# Patient Record
Sex: FEMALE | Race: WHITE | NOT HISPANIC OR LATINO | RURAL
[De-identification: names, ages, dates, MRNs, and addresses within clinical notes are randomized per-mention and may not be internally consistent; named-entity substitution may affect disease eponyms.]

---

## 2018-02-28 ENCOUNTER — OFFICE (AMBULATORY)
Dept: RURAL CLINIC 3 | Facility: CLINIC | Age: 73
End: 2018-02-28

## 2018-02-28 VITALS
HEIGHT: 66 IN | DIASTOLIC BLOOD PRESSURE: 85 MMHG | SYSTOLIC BLOOD PRESSURE: 151 MMHG | WEIGHT: 124 LBS | HEART RATE: 79 BPM

## 2018-02-28 DIAGNOSIS — R13.10 DYSPHAGIA, UNSPECIFIED: ICD-10-CM

## 2018-02-28 PROCEDURE — 99202 OFFICE O/P NEW SF 15 MIN: CPT | Performed by: NURSE PRACTITIONER

## 2018-04-04 ENCOUNTER — ON CAMPUS - OUTPATIENT (AMBULATORY)
Dept: RURAL HOSPITAL 3 | Facility: HOSPITAL | Age: 73
End: 2018-04-04

## 2018-04-04 DIAGNOSIS — K22.2 ESOPHAGEAL OBSTRUCTION: ICD-10-CM

## 2018-04-04 DIAGNOSIS — R13.10 DYSPHAGIA, UNSPECIFIED: ICD-10-CM

## 2018-04-04 DIAGNOSIS — K25.9 GASTRIC ULCER, UNSPECIFIED AS ACUTE OR CHRONIC, WITHOUT HEMO: ICD-10-CM

## 2018-04-04 PROCEDURE — 43450 DILATE ESOPHAGUS 1/MULT PASS: CPT | Performed by: INTERNAL MEDICINE

## 2018-04-04 PROCEDURE — 43235 EGD DIAGNOSTIC BRUSH WASH: CPT | Performed by: INTERNAL MEDICINE

## 2018-04-11 ENCOUNTER — OFFICE VISIT CONVERTED (OUTPATIENT)
Dept: ORTHOPEDIC SURGERY | Facility: CLINIC | Age: 73
End: 2018-04-11
Attending: ORTHOPAEDIC SURGERY

## 2018-05-02 ENCOUNTER — CONVERSION ENCOUNTER (OUTPATIENT)
Dept: ORTHOPEDIC SURGERY | Facility: CLINIC | Age: 73
End: 2018-05-02

## 2018-05-02 ENCOUNTER — OFFICE VISIT CONVERTED (OUTPATIENT)
Dept: ORTHOPEDIC SURGERY | Facility: CLINIC | Age: 73
End: 2018-05-02
Attending: ORTHOPAEDIC SURGERY

## 2019-09-11 ENCOUNTER — HOSPITAL ENCOUNTER (OUTPATIENT)
Dept: OTHER | Facility: HOSPITAL | Age: 74
Discharge: HOME | End: 2019-09-11
Attending: NURSE PRACTITIONER

## 2019-09-18 ENCOUNTER — HOSPITAL ENCOUNTER (OUTPATIENT)
Dept: OTHER | Facility: HOSPITAL | Age: 74
Discharge: HOME | End: 2019-09-18
Attending: NURSE PRACTITIONER

## 2020-01-16 ENCOUNTER — ON CAMPUS - OUTPATIENT (AMBULATORY)
Dept: URBAN - METROPOLITAN AREA HOSPITAL 2 | Facility: HOSPITAL | Age: 75
End: 2020-01-16
Payer: COMMERCIAL

## 2020-01-16 VITALS
SYSTOLIC BLOOD PRESSURE: 168 MMHG | RESPIRATION RATE: 16 BRPM | SYSTOLIC BLOOD PRESSURE: 222 MMHG | HEART RATE: 75 BPM | SYSTOLIC BLOOD PRESSURE: 171 MMHG | DIASTOLIC BLOOD PRESSURE: 79 MMHG | OXYGEN SATURATION: 92 % | OXYGEN SATURATION: 99 % | DIASTOLIC BLOOD PRESSURE: 96 MMHG | HEART RATE: 82 BPM | RESPIRATION RATE: 18 BRPM | TEMPERATURE: 97.7 F | HEART RATE: 84 BPM | HEART RATE: 80 BPM | HEIGHT: 66 IN | WEIGHT: 127 LBS | OXYGEN SATURATION: 100 % | SYSTOLIC BLOOD PRESSURE: 190 MMHG | SYSTOLIC BLOOD PRESSURE: 169 MMHG | DIASTOLIC BLOOD PRESSURE: 98 MMHG | OXYGEN SATURATION: 97 % | HEART RATE: 78 BPM | DIASTOLIC BLOOD PRESSURE: 130 MMHG | DIASTOLIC BLOOD PRESSURE: 89 MMHG | DIASTOLIC BLOOD PRESSURE: 104 MMHG

## 2020-01-16 DIAGNOSIS — K22.2 ESOPHAGEAL OBSTRUCTION: ICD-10-CM

## 2020-01-16 DIAGNOSIS — R13.10 DYSPHAGIA, UNSPECIFIED: ICD-10-CM

## 2020-01-16 PROCEDURE — 43450 DILATE ESOPHAGUS 1/MULT PASS: CPT | Performed by: INTERNAL MEDICINE

## 2020-01-16 PROCEDURE — 43235 EGD DIAGNOSTIC BRUSH WASH: CPT | Performed by: INTERNAL MEDICINE

## 2020-01-16 RX ORDER — PANTOPRAZOLE SODIUM 40 MG/1
40 TABLET, DELAYED RELEASE ORAL
Qty: 90 | Refills: 3 | Status: ACTIVE
Start: 2020-01-16

## 2021-05-16 VITALS — HEIGHT: 66 IN | HEART RATE: 109 BPM | WEIGHT: 127.5 LBS | OXYGEN SATURATION: 98 % | BODY MASS INDEX: 20.49 KG/M2

## 2021-05-16 VITALS — HEART RATE: 101 BPM | WEIGHT: 127 LBS | OXYGEN SATURATION: 95 % | HEIGHT: 66 IN | BODY MASS INDEX: 20.41 KG/M2

## 2022-09-07 ENCOUNTER — OFFICE (AMBULATORY)
Dept: RURAL CLINIC 3 | Facility: CLINIC | Age: 77
End: 2022-09-07

## 2022-09-07 VITALS
HEIGHT: 66 IN | WEIGHT: 123 LBS | HEART RATE: 74 BPM | SYSTOLIC BLOOD PRESSURE: 169 MMHG | DIASTOLIC BLOOD PRESSURE: 96 MMHG

## 2022-09-07 DIAGNOSIS — R13.10 DYSPHAGIA, UNSPECIFIED: ICD-10-CM

## 2022-09-07 PROCEDURE — 99214 OFFICE O/P EST MOD 30 MIN: CPT | Performed by: INTERNAL MEDICINE

## 2022-10-12 ENCOUNTER — ON CAMPUS - OUTPATIENT (AMBULATORY)
Dept: RURAL HOSPITAL 3 | Facility: HOSPITAL | Age: 77
End: 2022-10-12
Payer: COMMERCIAL

## 2022-10-12 DIAGNOSIS — K22.2 ESOPHAGEAL OBSTRUCTION: ICD-10-CM

## 2022-10-12 DIAGNOSIS — K57.30 DIVERTICULOSIS OF LARGE INTESTINE WITHOUT PERFORATION OR ABS: ICD-10-CM

## 2022-10-12 DIAGNOSIS — Z86.010 PERSONAL HISTORY OF COLONIC POLYPS: ICD-10-CM

## 2022-10-12 DIAGNOSIS — R13.10 DYSPHAGIA, UNSPECIFIED: ICD-10-CM

## 2022-10-12 PROCEDURE — 43450 DILATE ESOPHAGUS 1/MULT PASS: CPT | Mod: 59 | Performed by: INTERNAL MEDICINE

## 2022-10-12 PROCEDURE — G0105 COLORECTAL SCRN; HI RISK IND: HCPCS | Performed by: INTERNAL MEDICINE

## 2022-10-12 PROCEDURE — 43235 EGD DIAGNOSTIC BRUSH WASH: CPT | Performed by: INTERNAL MEDICINE

## 2024-08-15 ENCOUNTER — PREP FOR SURGERY (OUTPATIENT)
Dept: OTHER | Facility: HOSPITAL | Age: 79
End: 2024-08-15
Payer: MEDICARE

## 2024-08-15 ENCOUNTER — OFFICE VISIT (OUTPATIENT)
Dept: ORTHOPEDIC SURGERY | Facility: CLINIC | Age: 79
End: 2024-08-15
Payer: MEDICARE

## 2024-08-15 VITALS
WEIGHT: 115 LBS | SYSTOLIC BLOOD PRESSURE: 152 MMHG | DIASTOLIC BLOOD PRESSURE: 87 MMHG | OXYGEN SATURATION: 97 % | HEIGHT: 66 IN | HEART RATE: 90 BPM | BODY MASS INDEX: 18.48 KG/M2

## 2024-08-15 DIAGNOSIS — Z96.641 AFTERCARE FOLLOWING RIGHT HIP JOINT REPLACEMENT SURGERY: Primary | ICD-10-CM

## 2024-08-15 DIAGNOSIS — M25.551 RIGHT HIP PAIN: Primary | ICD-10-CM

## 2024-08-15 DIAGNOSIS — Z47.1 AFTERCARE FOLLOWING RIGHT HIP JOINT REPLACEMENT SURGERY: Primary | ICD-10-CM

## 2024-08-15 DIAGNOSIS — M16.11 PRIMARY OSTEOARTHRITIS OF RIGHT HIP: ICD-10-CM

## 2024-08-15 DIAGNOSIS — M16.11 PRIMARY OSTEOARTHRITIS OF RIGHT HIP: Primary | ICD-10-CM

## 2024-08-15 RX ORDER — TRANEXAMIC ACID 10 MG/ML
1000 INJECTION, SOLUTION INTRAVENOUS ONCE
OUTPATIENT
Start: 2024-08-15 | End: 2024-08-15

## 2024-08-15 RX ORDER — MULTIPLE VITAMINS W/ MINERALS TAB 9MG-400MCG
1 TAB ORAL DAILY
COMMUNITY

## 2024-08-15 RX ORDER — PANTOPRAZOLE SODIUM 40 MG/1
TABLET, DELAYED RELEASE ORAL
COMMUNITY

## 2024-08-15 RX ORDER — CHOLECALCIFEROL (VITAMIN D3) 25 MCG
1000 TABLET ORAL DAILY
COMMUNITY

## 2024-08-15 NOTE — PROGRESS NOTES
"Chief Complaint  Pain and Initial Evaluation of the Right Hip       Subjective      Mi Keane presents to [facility] for an evaluation of her right hip. Her hip has been bothering her for about a year and has gotten worse over the last few months. She has groin pain and locates her pain to the lateral hip that occasionally radiates down her leg. She denies any numbness and tingling. She reports no prior surgery or falls. She has pain getting in and out of the car and putting her shoes on and off.     No Known Allergies     Social History     Socioeconomic History    Marital status:    Tobacco Use    Smoking status: Never    Smokeless tobacco: Never   Vaping Use    Vaping status: Never Used        I reviewed the patient's chief complaint, history of present illness, review of systems, past medical history, surgical history, family history, social history, medications, and allergy list.     Review of Systems     Constitutional: Denies fevers, chills, weight loss  Cardiovascular: Denies chest pain, shortness of breath  Skin: Denies rashes, acute skin changes  Neurologic: Denies headache, loss of consciousness  MSK: Right hip pain       Vital Signs:   /87   Pulse 90   Ht 167.6 cm (66\")   Wt 52.2 kg (115 lb)   SpO2 97%   BMI 18.56 kg/m²            Ortho Exam    Physical Exam  General:Alert. No acute distress   Right lower extremity: flexion to 90 degrees, external rotation  to 30 degrees, internal rotation to 5 degrees, negative  straight leg raise, leg lengths equal, non tender, calf soft, neurovascularly intact, positive EHL, FHL, GS, and TA. Sensation intact to all 5 nerves of the foot.      Procedures    X-Ray Report:  Right hip X-Ray  Indication: Evaluation of right hip pain   AP/Lateral view(s)  Findings: advanced degenerative changes, no acute fracture, osteophytes to the right hip   Prior studies available for comparison: no       Imaging Results (Most Recent)  "      Procedure Component Value Units Date/Time    XR Hip With or Without Pelvis 2 - 3 View Right [[id number]] Resulted: 08/15/24 1031     Updated: 08/15/24 1034             Result Review :       No results found.           Assessment and Plan     Diagnoses and all orders for this visit:    1. Right hip pain (Primary)  -     XR Hip With or Without Pelvis 2 - 3 View Right    2. Primary osteoarthritis of right hip      The patient presents here today for an evaluation of her right hip. X-rays were obtained in the office today and these were reviewed today.     Discussed operative treatment options regarding a right hip arthroplasty versus non operative treatment options regarding injections, medications and physical therapy.     Patient wishes to proceed with operative treatment options. Risks and benefits regarding a right hip arthroplasty. Patient expressed understanding and wishes to proceed.      Discussed surgery., Discussed with patient the implant type being used during surgery and patient understands., Surgery pamphlet given., Call or return if worsening symptoms., DME order for a 3 in 1 given today due to patient will be confined to one room/level of the home that does not offer a toilet during postop recovery. , and I am ordering the use of the Nice1 cold therapy machine for 60 days post-op as part of an opioid-sparing approach to help manage pain and edema.  I feel this is medically necessary for the best care for this patient.       Follow Up     2 weeks post-operatively      Will obtain X-Rays of right hip  at next visit.       Patient was given instructions and counseling regarding her condition or for health maintenance advice. Please see specific information pulled into the AVS if appropriate.     Scribed for Talat Noguera MD by Nemo Martinez.  08/15/24   10:44 EDT    I have personally performed the services described in this document as scribed by the above individual and it is both accurate and  complete. Talat Noguera MD 08/15/24

## 2024-08-15 NOTE — H&P (VIEW-ONLY)
"Chief Complaint  Pain and Initial Evaluation of the Right Hip       Subjective      Mi Keane presents to [facility] for an evaluation of her right hip. Her hip has been bothering her for about a year and has gotten worse over the last few months. She has groin pain and locates her pain to the lateral hip that occasionally radiates down her leg. She denies any numbness and tingling. She reports no prior surgery or falls. She has pain getting in and out of the car and putting her shoes on and off.     No Known Allergies     Social History     Socioeconomic History    Marital status:    Tobacco Use    Smoking status: Never    Smokeless tobacco: Never   Vaping Use    Vaping status: Never Used        I reviewed the patient's chief complaint, history of present illness, review of systems, past medical history, surgical history, family history, social history, medications, and allergy list.     Review of Systems     Constitutional: Denies fevers, chills, weight loss  Cardiovascular: Denies chest pain, shortness of breath  Skin: Denies rashes, acute skin changes  Neurologic: Denies headache, loss of consciousness  MSK: Right hip pain       Vital Signs:   /87   Pulse 90   Ht 167.6 cm (66\")   Wt 52.2 kg (115 lb)   SpO2 97%   BMI 18.56 kg/m²            Ortho Exam    Physical Exam  General:Alert. No acute distress   Right lower extremity: flexion to 90 degrees, external rotation  to 30 degrees, internal rotation to 5 degrees, negative  straight leg raise, leg lengths equal, non tender, calf soft, neurovascularly intact, positive EHL, FHL, GS, and TA. Sensation intact to all 5 nerves of the foot.      Procedures    X-Ray Report:  Right hip X-Ray  Indication: Evaluation of right hip pain   AP/Lateral view(s)  Findings: advanced degenerative changes, no acute fracture, osteophytes to the right hip   Prior studies available for comparison: no       Imaging Results (Most Recent)  "      Procedure Component Value Units Date/Time    XR Hip With or Without Pelvis 2 - 3 View Right [[id number]] Resulted: 08/15/24 1031     Updated: 08/15/24 1034             Result Review :       No results found.           Assessment and Plan     Diagnoses and all orders for this visit:    1. Right hip pain (Primary)  -     XR Hip With or Without Pelvis 2 - 3 View Right    2. Primary osteoarthritis of right hip      The patient presents here today for an evaluation of her right hip. X-rays were obtained in the office today and these were reviewed today.     Discussed operative treatment options regarding a right hip arthroplasty versus non operative treatment options regarding injections, medications and physical therapy.     Patient wishes to proceed with operative treatment options. Risks and benefits regarding a right hip arthroplasty. Patient expressed understanding and wishes to proceed.      Discussed surgery., Discussed with patient the implant type being used during surgery and patient understands., Surgery pamphlet given., Call or return if worsening symptoms., DME order for a 3 in 1 given today due to patient will be confined to one room/level of the home that does not offer a toilet during postop recovery. , and I am ordering the use of the Nice1 cold therapy machine for 60 days post-op as part of an opioid-sparing approach to help manage pain and edema.  I feel this is medically necessary for the best care for this patient.       Follow Up     2 weeks post-operatively      Will obtain X-Rays of right hip  at next visit.       Patient was given instructions and counseling regarding her condition or for health maintenance advice. Please see specific information pulled into the AVS if appropriate.     Scribed for Talat Noguera MD by Nemo Martinez.  08/15/24   10:44 EDT    I have personally performed the services described in this document as scribed by the above individual and it is both accurate and  complete. Talat Noguera MD 08/15/24

## 2024-08-26 ENCOUNTER — PRE-ADMISSION TESTING (OUTPATIENT)
Dept: PREADMISSION TESTING | Facility: HOSPITAL | Age: 79
End: 2024-08-26
Payer: MEDICARE

## 2024-08-26 VITALS
HEIGHT: 63 IN | OXYGEN SATURATION: 98 % | SYSTOLIC BLOOD PRESSURE: 110 MMHG | RESPIRATION RATE: 16 BRPM | HEART RATE: 71 BPM | DIASTOLIC BLOOD PRESSURE: 78 MMHG | WEIGHT: 114.42 LBS | BODY MASS INDEX: 20.27 KG/M2 | TEMPERATURE: 98.1 F

## 2024-08-26 DIAGNOSIS — M16.11 PRIMARY OSTEOARTHRITIS OF RIGHT HIP: ICD-10-CM

## 2024-08-26 LAB
ALBUMIN SERPL-MCNC: 4 G/DL (ref 3.5–5.2)
ALBUMIN/GLOB SERPL: 1.3 G/DL
ALP SERPL-CCNC: 81 U/L (ref 39–117)
ALT SERPL W P-5'-P-CCNC: 11 U/L (ref 1–33)
ANION GAP SERPL CALCULATED.3IONS-SCNC: 10.7 MMOL/L (ref 5–15)
AST SERPL-CCNC: 15 U/L (ref 1–32)
BASOPHILS # BLD AUTO: 0.06 10*3/MM3 (ref 0–0.2)
BASOPHILS NFR BLD AUTO: 0.6 % (ref 0–1.5)
BILIRUB SERPL-MCNC: 0.4 MG/DL (ref 0–1.2)
BUN SERPL-MCNC: 19 MG/DL (ref 8–23)
BUN/CREAT SERPL: 20.2 (ref 7–25)
CALCIUM SPEC-SCNC: 10.6 MG/DL (ref 8.6–10.5)
CHLORIDE SERPL-SCNC: 97 MMOL/L (ref 98–107)
CO2 SERPL-SCNC: 26.3 MMOL/L (ref 22–29)
CREAT SERPL-MCNC: 0.94 MG/DL (ref 0.57–1)
DEPRECATED RDW RBC AUTO: 41.2 FL (ref 37–54)
EGFRCR SERPLBLD CKD-EPI 2021: 63 ML/MIN/1.73
EOSINOPHIL # BLD AUTO: 0.08 10*3/MM3 (ref 0–0.4)
EOSINOPHIL NFR BLD AUTO: 0.9 % (ref 0.3–6.2)
ERYTHROCYTE [DISTWIDTH] IN BLOOD BY AUTOMATED COUNT: 12.5 % (ref 12.3–15.4)
GLOBULIN UR ELPH-MCNC: 3 GM/DL
GLUCOSE SERPL-MCNC: 96 MG/DL (ref 65–99)
HBA1C MFR BLD: 5.7 % (ref 4.8–5.6)
HCT VFR BLD AUTO: 46.7 % (ref 34–46.6)
HGB BLD-MCNC: 15.2 G/DL (ref 12–15.9)
IMM GRANULOCYTES # BLD AUTO: 0.04 10*3/MM3 (ref 0–0.05)
IMM GRANULOCYTES NFR BLD AUTO: 0.4 % (ref 0–0.5)
LYMPHOCYTES # BLD AUTO: 0.83 10*3/MM3 (ref 0.7–3.1)
LYMPHOCYTES NFR BLD AUTO: 8.9 % (ref 19.6–45.3)
MCH RBC QN AUTO: 29.5 PG (ref 26.6–33)
MCHC RBC AUTO-ENTMCNC: 32.5 G/DL (ref 31.5–35.7)
MCV RBC AUTO: 90.5 FL (ref 79–97)
MONOCYTES # BLD AUTO: 0.73 10*3/MM3 (ref 0.1–0.9)
MONOCYTES NFR BLD AUTO: 7.8 % (ref 5–12)
NEUTROPHILS NFR BLD AUTO: 7.58 10*3/MM3 (ref 1.7–7)
NEUTROPHILS NFR BLD AUTO: 81.4 % (ref 42.7–76)
NRBC BLD AUTO-RTO: 0 /100 WBC (ref 0–0.2)
PLATELET # BLD AUTO: 336 10*3/MM3 (ref 140–450)
PMV BLD AUTO: 8.7 FL (ref 6–12)
POTASSIUM SERPL-SCNC: 4.7 MMOL/L (ref 3.5–5.2)
PROT SERPL-MCNC: 7 G/DL (ref 6–8.5)
QT INTERVAL: 376 MS
QTC INTERVAL: 414 MS
RBC # BLD AUTO: 5.16 10*6/MM3 (ref 3.77–5.28)
SODIUM SERPL-SCNC: 134 MMOL/L (ref 136–145)
WBC NRBC COR # BLD AUTO: 9.32 10*3/MM3 (ref 3.4–10.8)

## 2024-08-26 PROCEDURE — 85025 COMPLETE CBC W/AUTO DIFF WBC: CPT

## 2024-08-26 PROCEDURE — 80053 COMPREHEN METABOLIC PANEL: CPT

## 2024-08-26 PROCEDURE — 36415 COLL VENOUS BLD VENIPUNCTURE: CPT

## 2024-08-26 PROCEDURE — 83036 HEMOGLOBIN GLYCOSYLATED A1C: CPT

## 2024-08-26 PROCEDURE — 93005 ELECTROCARDIOGRAM TRACING: CPT

## 2024-08-26 NOTE — DISCHARGE INSTRUCTIONS
IMPORTANT INSTRUCTIONS - PRE-ADMISSION TESTING  DO NOT EAT OR CHEW anything after midnight the night before your procedure.    You may have CLEAR liquids up to __3___ hours prior to ARRIVAL time.   Take the following medications the morning of your procedure with JUST A SIP OF WATER:  _Prontonix _    DO NOT BRING your medications to the hospital with you, UNLESS something has changed since your PRE-Admission Testing appointment.  Hold all vitamins, supplements, and NSAIDS (Non- steroidal anti-inflammatory meds) for one week prior to surgery (you MAY take Tylenol or Acetaminophen). Including Calcium with Vitamin D and Mutivitamin  Make sure you have a ride home and have someone who will stay with you the day of your procedure after you go home.  If you have any questions, please call your Pre-Admission Testing Nurse, _Michelle GEE RN  at [phone].   Per anesthesia request, do not smoke for 24 hours before your procedure or as instructed by your surgeon.    Clear Liquid Diet        Find out when you need to start a clear liquid diet.   Think of “clear liquids” as anything you could read a newspaper through. This includes things like water, broth, sports drinks, or tea WITHOUT any kind of milk or cream.           Once you are told to start a clear liquid diet, only drink these things until 2 hours before arrival to the hospital or when the hospital says to stop. Total volume limitation: 8 oz.       Clear liquids you CAN drink:   Water   Clear broth: beef, chicken, vegetable, or bone broth with nothing in it   Gatorade   Lemonade or Griffin-aid   Soda   Tea, coffee (NO cream or honey)   Jell-O (without fruit)   Popsicles (without fruit or cream)   Italian ices   Juice without pulp: apple, white, grape   You may use salt, pepper, and sugar   NO red liquids     Do NOT drink:   Milk or cream   Soy milk, almond milk, coconut milk, or other non-dairy drinks and   creamers   Milkshakes or smoothies   Tomato juice   Drew  juice   Grapefruit juice   Cream soups or any other than broth         Clear Liquid Diet:  Do NOT eat any solid food.  Do NOT eat or suck on mints or candy.  Do NOT chew gum.  Do NOT drink thick liquids like milk or juice with pulp in it.  Do NOT add milk, cream, or anything like soy milk or almond milk to coffee or tea.       PREOPERATIVE (BEFORE SURGERY)              BATHING INSTRUCTIONS  Instructions:    You will need to shower 3 separate times utilizing the soap provided; at the times indicated   below:     8/2/24 PM    8/3/24 AM      8/3/24 PM        Wash your hair and face with normal shampoo and soap, rinse it well before using the surgical soap.      In the shower, wet the skin completely with water from your neck to your feet. Apply the cleanser to your   body ONLY FROM THE NECK TO YOUR FEET.     Do NOT USE THE CLEANSER ON YOUR FACE, HEAD, OR GENITAL (PRIVATE) AREAS.   Keep it out of your eyes, ears, and mouth because of the risk of injury to those areas.      Scrub with a clean washcloth for each bath utilizing the soap provided from the top of your body to the   bottom starting at the neck area.      Pay close attention to your armpits, groin area, and the site of surgery.      Wash your body gently for 5 minutes. Stand outside the stream or turn off the water while scrubbing your   body. Do NOT wash with your regular soap after the surgical cleanser is used.      RINSE THE CLEANSER OFF COMPLETELY with plenty of water. Rinse the area again thoroughly.      Dry off with a clean towel. The surgical soap can cause dryness; however do NOT APPLY LOTION,   CREAM, POWDER, and/or DEODORANT AFTER SHOWERING.     Be sure to where clean clothes after showering.      Ensure CLEAN BED LINENS AFTER FIRST wash with the surgical soap.      NO PETS ALLOWED IN THE BED with you after utilizing the surgical soap.

## 2024-08-26 NOTE — SIGNIFICANT NOTE
Pt goal is to have less pain so she can get back on her treadmill, Pt plans on using [facility] in [place] for therapy.  Pt's spouse to be her support with aftercare. She would like a 3 in 1 to use at home.

## 2024-08-27 ENCOUNTER — ANESTHESIA EVENT (OUTPATIENT)
Dept: PERIOP | Facility: HOSPITAL | Age: 79
End: 2024-08-27
Payer: MEDICARE

## 2024-08-27 DIAGNOSIS — M16.11 PRIMARY OSTEOARTHRITIS OF RIGHT HIP: Primary | ICD-10-CM

## 2024-09-04 ENCOUNTER — TELEPHONE (OUTPATIENT)
Dept: ORTHOPEDIC SURGERY | Facility: CLINIC | Age: 79
End: 2024-09-04

## 2024-09-04 ENCOUNTER — HOSPITAL ENCOUNTER (OUTPATIENT)
Facility: HOSPITAL | Age: 79
Discharge: HOME | End: 2024-09-05
Attending: ORTHOPAEDIC SURGERY | Admitting: ORTHOPAEDIC SURGERY
Payer: MEDICARE

## 2024-09-04 ENCOUNTER — APPOINTMENT (OUTPATIENT)
Dept: GENERAL RADIOLOGY | Facility: HOSPITAL | Age: 79
End: 2024-09-04
Payer: MEDICARE

## 2024-09-04 ENCOUNTER — ANESTHESIA (OUTPATIENT)
Dept: PERIOP | Facility: HOSPITAL | Age: 79
End: 2024-09-04
Payer: MEDICARE

## 2024-09-04 DIAGNOSIS — Z78.9 DECREASED ACTIVITIES OF DAILY LIVING (ADL): ICD-10-CM

## 2024-09-04 DIAGNOSIS — M16.11 PRIMARY OSTEOARTHRITIS OF RIGHT HIP: ICD-10-CM

## 2024-09-04 DIAGNOSIS — R26.2 DIFFICULTY IN WALKING: Primary | ICD-10-CM

## 2024-09-04 LAB
HCT VFR BLD AUTO: 38.5 % (ref 34–46.6)
HGB BLD-MCNC: 12.7 G/DL (ref 12–15.9)

## 2024-09-04 PROCEDURE — 25010000002 ROPIVACAINE PER 1 MG: Performed by: ORTHOPAEDIC SURGERY

## 2024-09-04 PROCEDURE — A9270 NON-COVERED ITEM OR SERVICE: HCPCS | Performed by: ORTHOPAEDIC SURGERY

## 2024-09-04 PROCEDURE — 76000 FLUOROSCOPY <1 HR PHYS/QHP: CPT

## 2024-09-04 PROCEDURE — A9270 NON-COVERED ITEM OR SERVICE: HCPCS | Performed by: ANESTHESIOLOGY

## 2024-09-04 PROCEDURE — 25010000002 PROPOFOL 10 MG/ML EMULSION

## 2024-09-04 PROCEDURE — 63710000001 ACETAMINOPHEN EXTRA STRENGTH 500 MG TABLET: Performed by: ORTHOPAEDIC SURGERY

## 2024-09-04 PROCEDURE — 25810000003 LACTATED RINGERS PER 1000 ML: Performed by: ANESTHESIOLOGY

## 2024-09-04 PROCEDURE — C1776 JOINT DEVICE (IMPLANTABLE): HCPCS | Performed by: ORTHOPAEDIC SURGERY

## 2024-09-04 PROCEDURE — 94799 UNLISTED PULMONARY SVC/PX: CPT

## 2024-09-04 PROCEDURE — 94761 N-INVAS EAR/PLS OXIMETRY MLT: CPT

## 2024-09-04 PROCEDURE — 25010000002 CEFAZOLIN PER 500 MG: Performed by: ORTHOPAEDIC SURGERY

## 2024-09-04 PROCEDURE — 99203 OFFICE O/P NEW LOW 30 MIN: CPT | Performed by: PHYSICIAN ASSISTANT

## 2024-09-04 PROCEDURE — 25010000002 MORPHINE PER 10 MG: Performed by: ORTHOPAEDIC SURGERY

## 2024-09-04 PROCEDURE — 73502 X-RAY EXAM HIP UNI 2-3 VIEWS: CPT

## 2024-09-04 PROCEDURE — 25810000003 LACTATED RINGERS PER 1000 ML: Performed by: ORTHOPAEDIC SURGERY

## 2024-09-04 PROCEDURE — 63710000001 FAMOTIDINE 20 MG TABLET: Performed by: ORTHOPAEDIC SURGERY

## 2024-09-04 PROCEDURE — 25010000002 PHENYLEPHRINE 10 MG/ML SOLUTION 5 ML VIAL

## 2024-09-04 PROCEDURE — 27130 TOTAL HIP ARTHROPLASTY: CPT | Performed by: PHYSICIAN ASSISTANT

## 2024-09-04 PROCEDURE — 63710000001 ACETAMINOPHEN EXTRA STRENGTH 500 MG TABLET: Performed by: ANESTHESIOLOGY

## 2024-09-04 PROCEDURE — 25010000002 MIDAZOLAM PER 1MG: Performed by: ANESTHESIOLOGY

## 2024-09-04 PROCEDURE — 85018 HEMOGLOBIN: CPT | Performed by: ORTHOPAEDIC SURGERY

## 2024-09-04 PROCEDURE — 27130 TOTAL HIP ARTHROPLASTY: CPT | Performed by: ORTHOPAEDIC SURGERY

## 2024-09-04 PROCEDURE — 25010000002 BUPIVACAINE PF 0.75 % SOLUTION: Performed by: ANESTHESIOLOGY

## 2024-09-04 PROCEDURE — 25010000002 EPINEPHRINE 1 MG/ML SOLUTION: Performed by: ORTHOPAEDIC SURGERY

## 2024-09-04 PROCEDURE — 25010000002 KETOROLAC TROMETHAMINE PER 15 MG: Performed by: ORTHOPAEDIC SURGERY

## 2024-09-04 PROCEDURE — 97161 PT EVAL LOW COMPLEX 20 MIN: CPT

## 2024-09-04 PROCEDURE — 25010000002 FENTANYL CITRATE (PF) 50 MCG/ML SOLUTION: Performed by: ANESTHESIOLOGY

## 2024-09-04 PROCEDURE — 63710000001 HYDROCODONE-ACETAMINOPHEN 7.5-325 MG TABLET: Performed by: ORTHOPAEDIC SURGERY

## 2024-09-04 PROCEDURE — 25810000003 SODIUM CHLORIDE 0.9 % SOLUTION 250 ML FLEX CONT

## 2024-09-04 PROCEDURE — 85014 HEMATOCRIT: CPT | Performed by: ORTHOPAEDIC SURGERY

## 2024-09-04 DEVICE — SHLL ACET G7 PPS LTD/HL TI SZE 52MM: Type: IMPLANTABLE DEVICE | Site: HIP | Status: FUNCTIONAL

## 2024-09-04 DEVICE — BIOLOX® DELTA, CERAMIC FEMORAL HEAD, S, Ø 36/-3.5, TAPER 12/14
Type: IMPLANTABLE DEVICE | Site: HIP | Status: FUNCTIONAL
Brand: BIOLOX® DELTA

## 2024-09-04 DEVICE — SUT NONABS MAXBRAID NMBR5 K60 38IN WHT/BLU: Type: IMPLANTABLE DEVICE | Site: HIP | Status: FUNCTIONAL

## 2024-09-04 DEVICE — IMPLANTABLE DEVICE: Type: IMPLANTABLE DEVICE | Site: HIP | Status: FUNCTIONAL

## 2024-09-04 DEVICE — STEM FEM/HIP AVENIRCOMPLETE COLAR HI/OFFST HA SZ5: Type: IMPLANTABLE DEVICE | Site: HIP | Status: FUNCTIONAL

## 2024-09-04 DEVICE — SCRW S/TAP 6.5X25MM: Type: IMPLANTABLE DEVICE | Site: HIP | Status: FUNCTIONAL

## 2024-09-04 DEVICE — TOTAL HIP PRIMARY: Type: IMPLANTABLE DEVICE | Site: HIP | Status: FUNCTIONAL

## 2024-09-04 RX ORDER — DOCUSATE SODIUM 100 MG/1
100 CAPSULE, LIQUID FILLED ORAL 2 TIMES DAILY PRN
Status: DISCONTINUED | OUTPATIENT
Start: 2024-09-04 | End: 2024-09-05 | Stop reason: HOSPADM

## 2024-09-04 RX ORDER — PROMETHAZINE HYDROCHLORIDE 12.5 MG/1
25 TABLET ORAL ONCE AS NEEDED
Status: DISCONTINUED | OUTPATIENT
Start: 2024-09-04 | End: 2024-09-04 | Stop reason: HOSPADM

## 2024-09-04 RX ORDER — SODIUM CHLORIDE, SODIUM LACTATE, POTASSIUM CHLORIDE, CALCIUM CHLORIDE 600; 310; 30; 20 MG/100ML; MG/100ML; MG/100ML; MG/100ML
100 INJECTION, SOLUTION INTRAVENOUS CONTINUOUS
Status: DISCONTINUED | OUTPATIENT
Start: 2024-09-04 | End: 2024-09-05 | Stop reason: HOSPADM

## 2024-09-04 RX ORDER — ACETAMINOPHEN 500 MG
1000 TABLET ORAL ONCE
Status: COMPLETED | OUTPATIENT
Start: 2024-09-04 | End: 2024-09-04

## 2024-09-04 RX ORDER — FAMOTIDINE 20 MG/1
40 TABLET, FILM COATED ORAL DAILY
Status: DISCONTINUED | OUTPATIENT
Start: 2024-09-04 | End: 2024-09-05 | Stop reason: HOSPADM

## 2024-09-04 RX ORDER — TRANEXAMIC ACID 10 MG/ML
1000 INJECTION, SOLUTION INTRAVENOUS ONCE
Status: COMPLETED | OUTPATIENT
Start: 2024-09-04 | End: 2024-09-04

## 2024-09-04 RX ORDER — HYDROCODONE BITARTRATE AND ACETAMINOPHEN 7.5; 325 MG/1; MG/1
1 TABLET ORAL EVERY 4 HOURS PRN
Status: DISCONTINUED | OUTPATIENT
Start: 2024-09-04 | End: 2024-09-05 | Stop reason: HOSPADM

## 2024-09-04 RX ORDER — CALCIUM CARBONATE 500 MG/1
2 TABLET, CHEWABLE ORAL 3 TIMES DAILY PRN
Status: DISCONTINUED | OUTPATIENT
Start: 2024-09-04 | End: 2024-09-05 | Stop reason: HOSPADM

## 2024-09-04 RX ORDER — OXYCODONE HYDROCHLORIDE 5 MG/1
5 TABLET ORAL
Status: DISCONTINUED | OUTPATIENT
Start: 2024-09-04 | End: 2024-09-04 | Stop reason: HOSPADM

## 2024-09-04 RX ORDER — ACETAMINOPHEN 500 MG
1000 TABLET ORAL EVERY 8 HOURS
Status: DISCONTINUED | OUTPATIENT
Start: 2024-09-04 | End: 2024-09-05 | Stop reason: HOSPADM

## 2024-09-04 RX ORDER — NALOXONE HCL 0.4 MG/ML
0.4 VIAL (ML) INJECTION
Status: DISCONTINUED | OUTPATIENT
Start: 2024-09-04 | End: 2024-09-05 | Stop reason: HOSPADM

## 2024-09-04 RX ORDER — ALUMINA, MAGNESIA, AND SIMETHICONE 2400; 2400; 240 MG/30ML; MG/30ML; MG/30ML
15 SUSPENSION ORAL EVERY 6 HOURS PRN
Status: DISCONTINUED | OUTPATIENT
Start: 2024-09-04 | End: 2024-09-05 | Stop reason: HOSPADM

## 2024-09-04 RX ORDER — ONDANSETRON 2 MG/ML
4 INJECTION INTRAMUSCULAR; INTRAVENOUS ONCE AS NEEDED
Status: DISCONTINUED | OUTPATIENT
Start: 2024-09-04 | End: 2024-09-04 | Stop reason: HOSPADM

## 2024-09-04 RX ORDER — BUPIVACAINE HYDROCHLORIDE 7.5 MG/ML
INJECTION, SOLUTION EPIDURAL; RETROBULBAR
Status: COMPLETED | OUTPATIENT
Start: 2024-09-04 | End: 2024-09-04

## 2024-09-04 RX ORDER — ONDANSETRON 2 MG/ML
4 INJECTION INTRAMUSCULAR; INTRAVENOUS EVERY 6 HOURS PRN
Status: DISCONTINUED | OUTPATIENT
Start: 2024-09-04 | End: 2024-09-05 | Stop reason: HOSPADM

## 2024-09-04 RX ORDER — PANTOPRAZOLE SODIUM 40 MG/1
40 TABLET, DELAYED RELEASE ORAL
Status: DISCONTINUED | OUTPATIENT
Start: 2024-09-05 | End: 2024-09-05 | Stop reason: HOSPADM

## 2024-09-04 RX ORDER — PROMETHAZINE HYDROCHLORIDE 25 MG/1
25 SUPPOSITORY RECTAL ONCE AS NEEDED
Status: DISCONTINUED | OUTPATIENT
Start: 2024-09-04 | End: 2024-09-04 | Stop reason: HOSPADM

## 2024-09-04 RX ORDER — HYDROCODONE BITARTRATE AND ACETAMINOPHEN 7.5; 325 MG/1; MG/1
2 TABLET ORAL EVERY 4 HOURS PRN
Status: DISCONTINUED | OUTPATIENT
Start: 2024-09-04 | End: 2024-09-05 | Stop reason: HOSPADM

## 2024-09-04 RX ORDER — MIDAZOLAM HYDROCHLORIDE 2 MG/2ML
0.5 INJECTION, SOLUTION INTRAMUSCULAR; INTRAVENOUS ONCE
Status: COMPLETED | OUTPATIENT
Start: 2024-09-04 | End: 2024-09-04

## 2024-09-04 RX ORDER — FENTANYL CITRATE 50 UG/ML
INJECTION, SOLUTION INTRAMUSCULAR; INTRAVENOUS
Status: COMPLETED | OUTPATIENT
Start: 2024-09-04 | End: 2024-09-04

## 2024-09-04 RX ORDER — BISMUTH SUBSALICYLATE 262 MG/1
524 TABLET, CHEWABLE ORAL
Status: DISCONTINUED | OUTPATIENT
Start: 2024-09-04 | End: 2024-09-05 | Stop reason: HOSPADM

## 2024-09-04 RX ORDER — SODIUM CHLORIDE 0.9 % (FLUSH) 0.9 %
10 SYRINGE (ML) INJECTION AS NEEDED
Status: DISCONTINUED | OUTPATIENT
Start: 2024-09-04 | End: 2024-09-05 | Stop reason: HOSPADM

## 2024-09-04 RX ORDER — PROMETHAZINE HYDROCHLORIDE 25 MG/1
12.5 TABLET ORAL EVERY 6 HOURS PRN
Status: DISCONTINUED | OUTPATIENT
Start: 2024-09-04 | End: 2024-09-05 | Stop reason: HOSPADM

## 2024-09-04 RX ORDER — FERROUS SULFATE 325(65) MG
325 TABLET ORAL
Status: DISCONTINUED | OUTPATIENT
Start: 2024-09-05 | End: 2024-09-05 | Stop reason: HOSPADM

## 2024-09-04 RX ORDER — MAGNESIUM HYDROXIDE 1200 MG/15ML
LIQUID ORAL AS NEEDED
Status: DISCONTINUED | OUTPATIENT
Start: 2024-09-04 | End: 2024-09-04 | Stop reason: HOSPADM

## 2024-09-04 RX ORDER — SODIUM CHLORIDE, SODIUM LACTATE, POTASSIUM CHLORIDE, CALCIUM CHLORIDE 600; 310; 30; 20 MG/100ML; MG/100ML; MG/100ML; MG/100ML
9 INJECTION, SOLUTION INTRAVENOUS CONTINUOUS PRN
Status: DISCONTINUED | OUTPATIENT
Start: 2024-09-04 | End: 2024-09-05 | Stop reason: HOSPADM

## 2024-09-04 RX ORDER — SODIUM CHLORIDE 0.9 % (FLUSH) 0.9 %
3 SYRINGE (ML) INJECTION EVERY 12 HOURS SCHEDULED
Status: DISCONTINUED | OUTPATIENT
Start: 2024-09-04 | End: 2024-09-05 | Stop reason: HOSPADM

## 2024-09-04 RX ORDER — KETOROLAC TROMETHAMINE 15 MG/ML
15 INJECTION, SOLUTION INTRAMUSCULAR; INTRAVENOUS EVERY 6 HOURS
Status: COMPLETED | OUTPATIENT
Start: 2024-09-04 | End: 2024-09-05

## 2024-09-04 RX ORDER — PROMETHAZINE HYDROCHLORIDE 12.5 MG/1
12.5 SUPPOSITORY RECTAL EVERY 6 HOURS PRN
Status: DISCONTINUED | OUTPATIENT
Start: 2024-09-04 | End: 2024-09-05 | Stop reason: HOSPADM

## 2024-09-04 RX ORDER — ACETAMINOPHEN 325 MG/1
325 TABLET ORAL EVERY 4 HOURS PRN
Status: DISCONTINUED | OUTPATIENT
Start: 2024-09-04 | End: 2024-09-05 | Stop reason: HOSPADM

## 2024-09-04 RX ORDER — ONDANSETRON 4 MG/1
4 TABLET, ORALLY DISINTEGRATING ORAL EVERY 6 HOURS PRN
Status: DISCONTINUED | OUTPATIENT
Start: 2024-09-04 | End: 2024-09-05 | Stop reason: HOSPADM

## 2024-09-04 RX ORDER — SODIUM CHLORIDE 9 MG/ML
40 INJECTION, SOLUTION INTRAVENOUS AS NEEDED
Status: DISCONTINUED | OUTPATIENT
Start: 2024-09-04 | End: 2024-09-05 | Stop reason: HOSPADM

## 2024-09-04 RX ADMIN — PROPOFOL 50 MCG/KG/MIN: 10 INJECTION, EMULSION INTRAVENOUS at 10:47

## 2024-09-04 RX ADMIN — SODIUM CHLORIDE, POTASSIUM CHLORIDE, SODIUM LACTATE AND CALCIUM CHLORIDE 100 ML/HR: 600; 310; 30; 20 INJECTION, SOLUTION INTRAVENOUS at 14:05

## 2024-09-04 RX ADMIN — SODIUM CHLORIDE, POTASSIUM CHLORIDE, SODIUM LACTATE AND CALCIUM CHLORIDE: 600; 310; 30; 20 INJECTION, SOLUTION INTRAVENOUS at 11:46

## 2024-09-04 RX ADMIN — ACETAMINOPHEN 1000 MG: 500 TABLET ORAL at 09:00

## 2024-09-04 RX ADMIN — SODIUM CHLORIDE, POTASSIUM CHLORIDE, SODIUM LACTATE AND CALCIUM CHLORIDE 100 ML/HR: 600; 310; 30; 20 INJECTION, SOLUTION INTRAVENOUS at 13:40

## 2024-09-04 RX ADMIN — ACETAMINOPHEN 1000 MG: 500 TABLET ORAL at 22:21

## 2024-09-04 RX ADMIN — ACETAMINOPHEN 1000 MG: 500 TABLET ORAL at 14:05

## 2024-09-04 RX ADMIN — FENTANYL CITRATE 10 MCG: 50 INJECTION, SOLUTION INTRAMUSCULAR; INTRAVENOUS at 10:45

## 2024-09-04 RX ADMIN — SODIUM CHLORIDE 2 G: 9 INJECTION, SOLUTION INTRAVENOUS at 17:35

## 2024-09-04 RX ADMIN — TRANEXAMIC ACID 1000 MG: 10 INJECTION, SOLUTION INTRAVENOUS at 11:46

## 2024-09-04 RX ADMIN — KETOROLAC TROMETHAMINE 15 MG: 15 INJECTION, SOLUTION INTRAMUSCULAR; INTRAVENOUS at 19:45

## 2024-09-04 RX ADMIN — TRANEXAMIC ACID 1000 MG: 10 INJECTION, SOLUTION INTRAVENOUS at 10:23

## 2024-09-04 RX ADMIN — BUPIVACAINE HYDROCHLORIDE 1.6 ML: 7.5 INJECTION, SOLUTION EPIDURAL; RETROBULBAR at 10:45

## 2024-09-04 RX ADMIN — Medication 3 ML: at 14:06

## 2024-09-04 RX ADMIN — Medication 3 ML: at 20:33

## 2024-09-04 RX ADMIN — MIDAZOLAM HYDROCHLORIDE 0.5 MG: 1 INJECTION, SOLUTION INTRAMUSCULAR; INTRAVENOUS at 10:23

## 2024-09-04 RX ADMIN — FAMOTIDINE 40 MG: 20 TABLET ORAL at 14:05

## 2024-09-04 RX ADMIN — SODIUM CHLORIDE 2 G: 9 INJECTION, SOLUTION INTRAVENOUS at 10:46

## 2024-09-04 RX ADMIN — HYDROCODONE BITARTRATE AND ACETAMINOPHEN 1 TABLET: 7.5; 325 TABLET ORAL at 16:14

## 2024-09-04 RX ADMIN — SODIUM CHLORIDE, POTASSIUM CHLORIDE, SODIUM LACTATE AND CALCIUM CHLORIDE 9 ML/HR: 600; 310; 30; 20 INJECTION, SOLUTION INTRAVENOUS at 08:47

## 2024-09-04 RX ADMIN — KETOROLAC TROMETHAMINE 15 MG: 15 INJECTION, SOLUTION INTRAMUSCULAR; INTRAVENOUS at 14:05

## 2024-09-04 RX ADMIN — PHENYLEPHRINE HYDROCHLORIDE 0.4 MCG/KG/MIN: 50 INJECTION INTRAVENOUS at 11:10

## 2024-09-04 NOTE — PLAN OF CARE
Goal Outcome Evaluation:  Plan of Care Reviewed With: patient           Outcome Evaluation: PAtient alert and oriented. x1 medicated for pain with stated relief. PAtient is a x1 assist to the bedside commode. Plans to discharge home tomorrow with outpatient PT scheduled.

## 2024-09-04 NOTE — ANESTHESIA PREPROCEDURE EVALUATION
Anesthesia Evaluation     Patient summary reviewed and Nursing notes reviewed   no history of anesthetic complications:   NPO Solid Status: > 8 hours  NPO Liquid Status: > 2 hours           Airway   Mallampati: I  TM distance: >3 FB  Neck ROM: full  Dental - normal exam     Pulmonary - negative pulmonary ROS and normal exam   Cardiovascular - normal exam  Exercise tolerance: poor (<4 METS)        Neuro/Psych- negative ROS  GI/Hepatic/Renal/Endo    (+) GERD    Musculoskeletal     Abdominal  - normal exam   Substance History - negative use     OB/GYN negative ob/gyn ROS         Other   arthritis,   history of cancer    ROS/Med Hx Other: <4METS,DEC.MOBILITY/PAIN.HX GERD. EKG REVIEW. TJR. KT               Anesthesia Plan    ASA 2     general and spinal     intravenous induction     Anesthetic plan, risks, benefits, and alternatives have been provided, discussed and informed consent has been obtained with: patient.    Plan discussed with CRNA.    CODE STATUS:

## 2024-09-04 NOTE — OP NOTE
TOTAL HIP ARTHROPLASTY ANTERIOR  Procedure Report    Patient Name:  Mi Keane  YOB: 1945    Date of Surgery:  9/4/2024     Indications:  The patient has had persistent hip pain and x-rays reveal advanced osteoarthritis.  They wish to proceed with operative treatment.  Risks and benefits of surgery were discussed.  Risk of bleeding, infection, damage to neurovascular structures, heart attack, stroke, DVT/PE, anesthesia complications including death, stiffness, leg length discrepancy, periprosthetic fracture, deep infection, among others were discussed.  Informed consent was obtained and they wish to proceed.      Pre-op Diagnosis:   Primary osteoarthritis of right hip [M16.11]       Post-Op Diagnosis Codes:     * Primary osteoarthritis of right hip [M16.11]    Procedure/CPT® Codes:      Procedure(s):  RIGHT TOTAL HIP ARTHROPLASTY ANTERIOR    Staff:  Surgeon(s):  Talat Noguera MD    Assistant: Sima Lobo; Yasmany Casanova PA    Surgical Approach: Hip Direct Anterior ([place]da[place])        Anesthesia: Monitored Anesthesia Care with Regional    Estimated Blood Loss: 200 mL    Implants:    Implant Name Type Inv. Item Serial No.  Lot No. LRB No. Used Action   SUT NONABS MAXBRAID NMBR5 K60 38IN WHT/MARY - [id number] Implant SUT NONABS MAXBRAID NMBR5 K60 38IN WHT/MARY  [employer] [id number] Right 2 Implanted   LINER ACET G7/LONGEVITY NTRL HXPE GERALD 36MM - [id number] Implant LINER ACET G7/LONGEVITY NTRL HXPE GERALD 36MM  [employer] [id number] Right 1 Implanted   SCRW S/TAP 6.5X25MM - [id number] Implant SCRW S/TAP 6.5X25MM  [employer] [id number] Right 1 Implanted   SHLL ACET G7 PPS LTD/HL TI GERALD 52MM - [id number] Implant SHLL ACET G7 PPS LTD/HL TI GERALD 52MM  [employer] [id number] Right 1 Implanted   HD FEM/HIP BIOLOX/DELTA CERAM 12/91T04AE MIN3.5MM - [id number] Implant HD FEM/HIP BIOLOX/DELTA CERAM 12/04F01WE MIN3.5MM  [employer] [id number] Right 1 Implanted   STEM  FEM/HIP AVENIRCOMPLETE COLAR HI/OFFST HA SZ5 - [id number] Implant STEM FEM/HIP AVENIRCOMPLETE COLAR HI/OFFST HA SZ5  [facility] [id number] Right 1 Implanted       Specimen:          None        Findings: hip osteoarthritis    Complications: None    Description of Procedure: Operative site was marked in the preoperative holding area.  The patient was brought to the operating room and placed supine on the [facility] operative table.  General anesthesia was given.  All bony prominences were padded. Both legs were placed into padded traction boots. A padded peroneal post was placed. Preoperative antibiotic and tranexamic acid was given. Formal time-out was held.   Incision was made over the anterior hip just lateral and distal to the ASIS. The fascia was sharply divided and the TFL muscle was retracted laterally. The circumflex vessels were treated with the bipolar sealer. The capsule was incised in an inverted T capsulotomy and tagged with nonabsorbable suture. The femoral neck osteotomy was made and the femoral head was removed. The labrum was excised. Acetabular reaming was performed with fluoroscopic guidance. An appropriately sized acetabular component was inserted in the appropriate position. A screw was inserted, and the polyethylene liner was placed.  At this point the appropriate femoral releases were performed and the femur was exposed. We began preparing the femur with the broach. Sequential broaching was performed until an appropriately sized broach was placed in neutral anteversion and the hip was trialed. Fluoroscopic views of the hip were taken and confirmed adequate size and appropriate leg length and offset. The hip was dislocated, and the trials removed. The femoral implant was placed and was axially and rotationally stable. The hip was trialed a final time, and the appropriate femoral head was inserted. The hip was reduced and final images were taken. The hip was irrigated with irrisept solution and  bacitracin saline. Local anesthetic was injected around the capsule, and the capsule was closed with #1 vicryl. The fascia was closed with #1 vicryl and the subcutaneous tissues with 2-0 vicryl. The skin was closed with staples and an aquacel dressing was placed. The patient awoke form anesthesia in stable condition. All counts were correct. There were no complications.    Assistant: Sima Lobo; Yasmany Casanova PA  was responsible for performing the following activities: Retraction, Suction, Irrigation, and Placing Dressing and their skilled assistance was necessary for the success of this case.    Talat Noguera MD     Date: 9/4/2024  Time: 12:10 EDT

## 2024-09-04 NOTE — THERAPY EVALUATION
Acute Care - Physical Therapy Initial Evaluation   Ibeth     Patient Name: Mi Keane  : 1945  MRN: [id number]  Today's Date: 2024   Onset of Illness/Injury or Date of Surgery: 24  Visit Dx:     ICD-10-CM ICD-9-CM   1. Difficulty in walking  R26.2 719.7   2. Primary osteoarthritis of right hip  M16.11 715.15     Patient Active Problem List   Diagnosis    Arthritis of right hip    Primary osteoarthritis of right hip     Past Medical History:   Diagnosis Date    Cancer     GERD (gastroesophageal reflux disease)     Skin cancer     Basal cell carcinoma on back     Past Surgical History:   Procedure Laterality Date    BACK SURGERY  2019    laminectomy    COLONOSCOPY      ENDOSCOPY      ENDOSCOPY      with esophageal dilatation    LAMINECTOMY       PT Assessment (Last 12 Hours)       PT Evaluation and Treatment       Row Name 24 1528          Physical Therapy Time and Intention    Subjective Information no complaints  -LICHA (r) EW (t) LICHA (c)     Document Type evaluation  -LICHA (r) EW (t) LICHA (c)     Mode of Treatment individual therapy;physical therapy  -LICHA (r) EW (t) LICHA (c)     Total Minutes, Physical Therapy 20  -LICHA (r) EW (t) LICHA (c)     Patient Effort good  -LICHA (r) EW (t) LICHA (c)     Symptoms Noted During/After Treatment none  -LICHA (r) EW (t) LICHA (c)       Row Name 24 1528          General Information    Patient Profile Reviewed yes  -LIHCA (r) EW (t) LICHA (c)     Onset of Illness/Injury or Date of Surgery 24  -LICHA (r) EW (t) LICHA (c)     Referring Physician Shona Christensen MD  -LICHA (r) EW (t) LICHA (c)     Patient Observations alert;cooperative;agree to therapy  -LICHA (r) EW (t) LICHA (c)     Prior Level of Function independent:  -LICHA (r) EW (t) LICHA (c)     Equipment Currently Used at Home none  -LICHA (r) EW (t) LICHA (c)     Benefits Reviewed patient:;improve function;increase independence;increase strength;increase balance;decrease pain  -LICHA (r) EW (t) LICHA (c)     Barriers to Rehab none identified   -LICHA (r) EW (t) LICHA (c)       Row Name 09/04/24 1528          Living Environment    Current Living Arrangements home  -LICHA (r) EW (t) LICHA (c)     Home Accessibility stairs to enter home  -LICHA (r) EW (t) LICHA (c)     People in Home spouse  -LICHA (r) EW (t) LICHA (c)     Primary Care Provided by self  -LICHA (r) EW (t) LICHA (c)       Row Name 09/04/24 1528          Home Main Entrance    Number of Stairs, Main Entrance four  -LICHA (r) EW (t) LICHA (c)       Row Name 09/04/24 1528          Home Use of Assistive/Adaptive Equipment    Equipment Currently Used at Home none  -LICHA (r) EW (t) LICHA (c)       Row Name 09/04/24 1528          Range of Motion (ROM)    Range of Motion bilateral lower extremities;other (see comments)  R hip ROM: 0-90  -LICHA (r) EW (t) LICHA (c)       Row Name 09/04/24 1528          Strength (Manual Muscle Testing)    Strength (Manual Muscle Testing) bilateral lower extremities;other (see comments)  BLE 5/5 bilaterally  -LICHA (r) EW (t) LICHA (c)       Row Name 09/04/24 1528          Mobility    Extremity Weight-bearing Status right lower extremity  -LICHA (r) EW (t) LICHA (c)     Right Lower Extremity (Weight-bearing Status) weight-bearing as tolerated (WBAT)  -LICHA (r) EW (t) LICHA (c)       Row Name 09/04/24 1528          Transfers    Transfers sit-stand transfer;stand-sit transfer  -LICHA (r) EW (t) LICHA (c)     Maintains Weight-bearing Status (Transfers) able to maintain  -LICHA (r) EW (t) LICHA (c)       Row Name 09/04/24 1528          Sit-Stand Transfer    Sit-Stand Funk (Transfers) contact guard  -LICHA     Assistive Device (Sit-Stand Transfers) walker, front-wheeled  -LICHA (r) EW (t) LICHA (c)       Row Name 09/04/24 1528          Stand-Sit Transfer    Stand-Sit Funk (Transfers) contact guard  -LICHA     Assistive Device (Stand-Sit Transfers) walker, front-wheeled  -LICHA (r) EW (t) LICHA (c)       Row Name 09/04/24 1528          Gait/Stairs (Locomotion)    Gait/Stairs Locomotion gait/ambulation assistive device  -LICHA canela) AUDIE coy) LICHA cheema)      [place] Level (Gait) contact guard  -LICHA     Assistive Device (Gait) walker, front-wheeled  -LICHA (r) EW (t) LICHA (c)     Patient was able to Ambulate yes  -LICHA (r) EW (t) LICHA (c)     Distance in Feet (Gait) 100  -LICHA (r) EW (t) LICHA (c)       Row Name 09/04/24 1528          Balance    Balance Assessment standing dynamic balance  -LICHA (r) EW (t) LICHA (c)     Dynamic Standing Balance contact guard  -LICHA     Position/Device Used, Standing Balance walker, front-wheeled  -LICHA (r) EW (t) LICHA (c)       Row Name             Wound 09/04/24 Right thigh Incision    Wound - Properties Group Placement Date: 09/04/24  -SF Present on Original Admission: N  -SF Side: Right  -SF Location: thigh  -SF Primary Wound Type: Incision  -SF    Retired Wound - Properties Group Placement Date: 09/04/24  -SF Present on Original Admission: N  -SF Side: Right  -SF Location: thigh  -SF Primary Wound Type: Incision  -SF    Retired Wound - Properties Group Date first assessed: 09/04/24  -SF Present on Original Admission: N  -SF Side: Right  -SF Location: thigh  -SF Primary Wound Type: Incision  -SF      Row Name 09/04/24 1528          Plan of Care Review    Plan of Care Reviewed With patient  -LICHA (r) EW (t) LICHA (c)     Outcome Evaluation Pt demonstrated mild limitations in hip ROM (0-90) and no deficits in LE strength, or mobility. Recommended d/c to home with outpatient PT for post-op care.  -LICHA       Row Name 09/04/24 1528          Therapy Assessment/Plan (PT)    Rehab Potential (PT) good, to achieve stated therapy goals  -LICHA     Criteria for Skilled Interventions Met (PT) skilled treatment is necessary  -LICHA (r) EW (t) LICHA (c)     Therapy Frequency (PT) 2 times/day  -LICHA (r) EW (t) LICHA (c)     Predicted Duration of Therapy Intervention (PT) 10 days  -LICHA (r) EW (t) LICHA (c)     Problem List (PT) problems related to;balance;coordination;mobility;range of motion (ROM);strength  -LICHA (r) EW (t) LICHA (c)     Activity Limitations Related to Problem List (PT) unable to  transfer safely;unable to ambulate safely  -LICHA       Row Name 09/04/24 1528          Therapy Plan Review/Discharge Plan (PT)    Therapy Plan Review (PT) evaluation/treatment results reviewed;care plan/treatment goals reviewed;participants included;patient  -LICHA (r) EW (t) LICHA (c)       Row Name 09/04/24 1528          Physical Therapy Goals    Transfer Goal Selection (PT) transfer, PT goal 1  -LICHA (r) EW (t) LICHA (c)     Gait Training Goal Selection (PT) gait training, PT goal 1  -LICHA (r) EW (t) LICHA (c)       Row Name 09/04/24 1528          Transfer Goal 1 (PT)    Activity/Assistive Device (Transfer Goal 1, PT) transfers, all  -LICHA (r) EW (t) LICHA (c)     [place] Level/Cues Needed (Transfer Goal 1, PT) independent  -LICHA (r) EW (t) LICHA (c)     Time Frame (Transfer Goal 1, PT) long term goal (LTG);10 days  -LICHA (r) EW (t) LICHA (c)       Row Name 09/04/24 1528          Gait Training Goal 1 (PT)    Activity/Assistive Device (Gait Training Goal 1, PT) gait (walking locomotion)  -LICHA (r) EW (t) LICHA (c)     [place] Level (Gait Training Goal 1, PT) independent  -LICHA (r) EW (t) LICHA (c)     Distance (Gait Training Goal 1, PT) 100  -LICHA (r) EW (t) LICHA (c)     Time Frame (Gait Training Goal 1, PT) long term goal (LTG);10 days  -LICHA (r) EW (t) LICHA (c)               User Key  (r) = Recorded By, (t) = Taken By, (c) = Cosigned By      Initials Name Provider Type    SF Betty Rollins, RN Registered Nurse    Pineda Hernandez, PT Physical Therapist    Brian Presley, PT Student PT Student                    Physical Therapy Education       Title: PT OT SLP Therapies (Done)       Topic: Physical Therapy (Done)       Point: Mobility training (Done)       Learning Progress Summary             Patient Acceptance, E,TB, VU by EW at 9/4/2024 1540                                         User Key       Initials Effective Dates Name Provider Type Discipline     08/27/24 -  Brian Thomas, PT Student PT Student PT                  PT Recommendation  and Plan     Outcome Evaluation: Pt demonstrated mild limitations in hip ROM (0-90) and no deficits in LE strength, or mobility. Recommended d/c to home with outpatient PT for post-op care.   Outcome Measures       Row Name 09/04/24 1500             How much help from another person do you currently need...    Turning from your back to your side while in flat bed without using bedrails? 4  -LICHA (r) EW (t) LICHA (c)      Moving from lying on back to sitting on the side of a flat bed without bedrails? 4  -LICHA (r) EW (t) LICHA (c)      Moving to and from a bed to a chair (including a wheelchair)? 4  -LICHA (r) EW (t) LICHA (c)      Standing up from a chair using your arms (e.g., wheelchair, bedside chair)? 4  -LICHA (r) EW (t) LICHA (c)      Climbing 3-5 steps with a railing? 3  -LICHA (r) EW (t) LICHA (c)      To walk in hospital room? 4  -LICHA (r) EW (t) LICHA (c)      AM-PAC 6 Clicks Score (PT) 23  -LICHA (r) EW (t)      Highest Level of Mobility Goal 7 --> Walk 25 feet or more  -LICHA (r) EW (t)                User Key  (r) = Recorded By, (t) = Taken By, (c) = Cosigned By      Initials Name Provider Type    Pineda Hernandez, PT Physical Therapist    Brian Presley, PT Student PT Student                     Time Calculation:    PT Charges       Row Name 09/04/24 1528             Time Calculation    PT Received On 09/04/24  -LICHA (r) EW (t) LICHA (c)      PT Goal Re-Cert Due Date 09/13/24  -LICHA (r) EW (t) LICHA (c)         Untimed Charges    PT Eval/Re-eval Minutes 20  -LICHA (r) EW (t) LICHA (c)         Total Minutes    Untimed Charges Total Minutes 20  -LICHA (r) EW (t)       Total Minutes 20  -LICHA (r) EW (t)                User Key  (r) = Recorded By, (t) = Taken By, (c) = Cosigned By      Initials Name Provider Type    Pineda Hernandez, PT Physical Therapist    EW Brian Thomas, PT Student PT Student                      PT G-Codes  AM-PAC 6 Clicks Score (PT): 23    Pineda Murcia, PT  9/4/2024

## 2024-09-04 NOTE — ANESTHESIA POSTPROCEDURE EVALUATION
Patient: Mi Keane    Procedure Summary       Date: 09/04/24 Room / Location: [facility] OR 03 / [facility] MAIN OR    Anesthesia Start: 1036 Anesthesia Stop: 1213    Procedure: RIGHT TOTAL HIP ARTHROPLASTY ANTERIOR (Right: Hip) Diagnosis:       Primary osteoarthritis of right hip      (Primary osteoarthritis of right hip [M16.11])    Surgeons: Talat Noguera MD Provider: Prosper Traylor MD    Anesthesia Type: general, spinal ASA Status: 2            Anesthesia Type: general, spinal    Vitals  Vitals Value Taken Time   /67 09/04/24 1250   Temp 36.3 °C (97.3 °F) 09/04/24 1250   Pulse 73 09/04/24 1301   Resp 16 09/04/24 1250   SpO2 100 % 09/04/24 1301   Vitals shown include unfiled device data.        Post Anesthesia Care and Evaluation    Patient location during evaluation: bedside  Patient participation: complete - patient participated  Level of consciousness: awake    Airway patency: patent  PONV Status: none  Cardiovascular status: acceptable  Respiratory status: acceptable  Hydration status: acceptable

## 2024-09-04 NOTE — ANESTHESIA PROCEDURE NOTES
Spinal Block      Patient reassessed immediately prior to procedure    Patient location during procedure: OR  Start Time: 9/4/2024 10:45 AM  Indication:at surgeon's request and post-op pain management  Performed By  CRNA/CAA: Eliel Juarez CRNA  Preanesthetic Checklist  Completed: patient identified, IV checked, risks and benefits discussed, surgical consent, monitors and equipment checked, pre-op evaluation and timeout performed  Spinal Block Prep:  Sterile Tech:cap, gloves, mask and sterile barriers  Prep:Chloraprep  Patient Monitoring:blood pressure monitoring, continuous pulse oximetry and EKG    Spinal Block Procedure  Approach:midline  Guidance:landmark technique and palpation technique  Location:L4-L5  Needle Type:Pencan  Needle Gauge:24 G  Placement of Spinal needle event:cerebrospinal fluid aspirated  Paresthesia: no  Fluid Appearance:clear  Medications: bupivacaine PF (MARCAINE) injection 0.75% - Intrathecal   1.6 mL - 9/4/2024 10:45:00 AM  fentaNYL (SUBLIMAZE) injection - Intrathecal   10 mcg - 9/4/2024 10:45:00 AM   Post Assessment  Patient Tolerance:patient tolerated the procedure well with no apparent complications  Complications no  Additional Notes  Skin infiltration with Lidocaine 1%. Introducer inserted, followed by spinal needle. + CSF return. Intrathecal marcaine injected. Spinal needle/introducer removed. Site clean/dry/intact.

## 2024-09-04 NOTE — PLAN OF CARE
Goal Outcome Evaluation:              Outcome Evaluation: Pt demonstrated mild limitations in hip ROM (0-90) and no deficits in LE strength, or mobility. Recommended d/c to home with outpatient PT for post-op care.

## 2024-09-04 NOTE — TELEPHONE ENCOUNTER
Provider: SHELBI    Caller: CHERIE KIRAN    Relationship to Patient: SON    Phone Number: [phone]    Reason for Call: CHERIE ASKING [facility] PAPERWORK BEING FAXED TO EMPLOYER, STATING HE WAS TOLD IT WAS FAXED BUT EMPLOYER HAS NOT RECEIVED. CHERIE WANTING TO KNOW IF HE NEEDS TO COME BY OFFICE OR IF IT CAN BE REFAXED.

## 2024-09-04 NOTE — CONSULTS
[facility]   HOSPITALIST HISTORY AND PHYSICAL  Date: 2024   Patient Name: Mi Keane  : 1945  MRN: [id number]  Primary Care Physician:  Lalo Yeboah MD  Date of admission: 2024    Subjective   Subjective   Chief Complaint: Medical management    HPI:    Mi Keane is a 78 y.o. female who is being seen by hospitalist service for general medical management of chronic medical issues including GERD.  She also has osteoarthritis right hip and had right hip replacement 24 by Dr. Noguera.  Recent lab work shows creatinine 0.94, hemoglobin 15.2, platelets 336, A1c 5.7.  Resting EKG 24 shows normal sinus rhythm rate 73 bpm.  Nonspecific intraventricular conduction delay.  Rightward axis.  Current vital signs include /67, RR 16, T97.3, O2 sats 100% on room air.  Currently resting in recliner.   and son at bedside.  Patient is alert and conversational.  Right leg is still somewhat numb from nerve block/surgery earlier today.  Denies any nausea or vomiting.  No dyspepsia.  No chest pain or shortness of breath.  Pertinent History   Past Medical History:    Active Ambulatory Problems     Diagnosis Date Noted    Arthritis of right hip 08/15/2024    Primary osteoarthritis of right hip 08/15/2024     Resolved Ambulatory Problems     Diagnosis Date Noted    No Resolved Ambulatory Problems     Past Medical History:   Diagnosis Date    Cancer     GERD (gastroesophageal reflux disease)     Skin cancer        Past Surgical History:    Past Surgical History:   Procedure Laterality Date    BACK SURGERY  2019    laminectomy    COLONOSCOPY      ENDOSCOPY      ENDOSCOPY      with esophageal dilatation    LAMINECTOMY         Social History:   Retired.  Medical records.  Non-smoker.  Active, treadmill usually  Lives with  in [place]  Social History     Socioeconomic History    Marital status:    Tobacco Use    Smoking status: Never    Smokeless tobacco: Never    Vaping Use    Vaping status: Never Used   Substance and Sexual Activity    Alcohol use: Never    Drug use: Never    Sexual activity: Defer       Family History:     Family History   Problem Relation Age of Onset    Malig Hyperthermia Neg Hx        Home Medications (reported)  Current Outpatient Medications   Medication Instructions    Calcium Carbonate-Vit D-Min (CALCIUM 1200 PO) Oral    multivitamin with minerals tablet tablet 1 tablet, Oral, Daily    pantoprazole (PROTONIX) 40 MG EC tablet pantoprazole 40 mg oral tablet,delayed release (DR/EC) take 1 tablet (40 mg) by oral route once daily   Active       Allergies:  No Known Allergies    REVIEW OF SYSTEMS:   Right hip pain    Objective   Objective   Vitals:   Temp:  [96.2 °F (35.7 °C)-98.2 °F (36.8 °C)] 97.3 °F (36.3 °C)  Heart Rate:  [56-82] 61  Resp:  [12-18] 16  BP: ()/(58-89) 119/67  Flow (L/min):  [2] 2  PHYSICAL EXAM   CON: WN. WD. NAD.   NECK:  No thyromegaly. No stridor. Trachea midline.  RESP:  CTA. No wheezes. No crackles.  No work of breathing or tachypnea.   CV:  Rhythm regular. Rate WNL. No murmur noted.  No edema.  GI:  Soft and nontender. Nondistended.  EXT: Right hip dressing intact  PSYCH:  Alert. Oriented. Normal affect and mood.  NEURO:  No dysarthria or aphasia. No unilateral weakness or paresthesia.  SKIN: No chronic venous stasis changes or varicosities.  No cellulitis    Result Review    Result Review:  I have personally reviewed the results from the time of this admission to 9/4/2024 13:48 EDT and agree with these findings:  []  Laboratory  []  Microbiology  []  Radiology  []  EKG/Telemetry   []  Cardiology/Vascular   []  Pathology  []  Old records  []  Other:    Assessment & Plan   Assessment / Plan   Assessment:    Medical management  GERD  OA right hip  Right hip replacement 9/4/24 by Dr. Noguera     Plan:    Gentle IV fluid hydration  Monitor blood pressure trend.  Monitor for orthostasis.  Check BMP in the morning  Continue  PPI  Daily PT  Pain med Rx per orthopedist  DVT prophylaxis per orthopedist    VTE Prophylaxis:  Pharmacologic & mechanical VTE prophylaxis orders are present.      CODE STATUS:         Electronically signed by SABRINA Albert, 09/04/24, 1:48 PM EDT.

## 2024-09-05 VITALS
RESPIRATION RATE: 16 BRPM | WEIGHT: 114.64 LBS | DIASTOLIC BLOOD PRESSURE: 84 MMHG | OXYGEN SATURATION: 98 % | HEIGHT: 65 IN | BODY MASS INDEX: 19.1 KG/M2 | HEART RATE: 100 BPM | SYSTOLIC BLOOD PRESSURE: 152 MMHG | TEMPERATURE: 97.7 F

## 2024-09-05 LAB
ANION GAP SERPL CALCULATED.3IONS-SCNC: 8.4 MMOL/L (ref 5–15)
BUN SERPL-MCNC: 12 MG/DL (ref 8–23)
BUN/CREAT SERPL: 13.3 (ref 7–25)
CALCIUM SPEC-SCNC: 9.2 MG/DL (ref 8.6–10.5)
CHLORIDE SERPL-SCNC: 104 MMOL/L (ref 98–107)
CO2 SERPL-SCNC: 23.6 MMOL/L (ref 22–29)
CREAT SERPL-MCNC: 0.9 MG/DL (ref 0.57–1)
DEPRECATED RDW RBC AUTO: 41.8 FL (ref 37–54)
EGFRCR SERPLBLD CKD-EPI 2021: 66.4 ML/MIN/1.73
ERYTHROCYTE [DISTWIDTH] IN BLOOD BY AUTOMATED COUNT: 12.7 % (ref 12.3–15.4)
GLUCOSE SERPL-MCNC: 105 MG/DL (ref 65–99)
HCT VFR BLD AUTO: 35 % (ref 34–46.6)
HGB BLD-MCNC: 11.5 G/DL (ref 12–15.9)
MCH RBC QN AUTO: 30 PG (ref 26.6–33)
MCHC RBC AUTO-ENTMCNC: 32.9 G/DL (ref 31.5–35.7)
MCV RBC AUTO: 91.4 FL (ref 79–97)
PLATELET # BLD AUTO: 265 10*3/MM3 (ref 140–450)
PMV BLD AUTO: 8.9 FL (ref 6–12)
POTASSIUM SERPL-SCNC: 4.5 MMOL/L (ref 3.5–5.2)
QT INTERVAL: 376 MS
QTC INTERVAL: 414 MS
RBC # BLD AUTO: 3.83 10*6/MM3 (ref 3.77–5.28)
SODIUM SERPL-SCNC: 136 MMOL/L (ref 136–145)
WBC NRBC COR # BLD AUTO: 8.52 10*3/MM3 (ref 3.4–10.8)

## 2024-09-05 PROCEDURE — 80048 BASIC METABOLIC PNL TOTAL CA: CPT | Performed by: ORTHOPAEDIC SURGERY

## 2024-09-05 PROCEDURE — 97535 SELF CARE MNGMENT TRAINING: CPT

## 2024-09-05 PROCEDURE — 97165 OT EVAL LOW COMPLEX 30 MIN: CPT

## 2024-09-05 PROCEDURE — 63710000001 FERROUS SULFATE 325 (65 FE) MG TABLET: Performed by: ORTHOPAEDIC SURGERY

## 2024-09-05 PROCEDURE — A9270 NON-COVERED ITEM OR SERVICE: HCPCS | Performed by: ORTHOPAEDIC SURGERY

## 2024-09-05 PROCEDURE — 85027 COMPLETE CBC AUTOMATED: CPT | Performed by: PHYSICIAN ASSISTANT

## 2024-09-05 PROCEDURE — 99213 OFFICE O/P EST LOW 20 MIN: CPT | Performed by: PHYSICIAN ASSISTANT

## 2024-09-05 PROCEDURE — 63710000001 FAMOTIDINE 20 MG TABLET: Performed by: ORTHOPAEDIC SURGERY

## 2024-09-05 PROCEDURE — 25010000002 CEFAZOLIN PER 500 MG: Performed by: ORTHOPAEDIC SURGERY

## 2024-09-05 PROCEDURE — A9270 NON-COVERED ITEM OR SERVICE: HCPCS | Performed by: PHYSICIAN ASSISTANT

## 2024-09-05 PROCEDURE — 97150 GROUP THERAPEUTIC PROCEDURES: CPT

## 2024-09-05 PROCEDURE — 63710000001 PANTOPRAZOLE 40 MG TABLET DELAYED-RELEASE: Performed by: PHYSICIAN ASSISTANT

## 2024-09-05 PROCEDURE — 25010000002 KETOROLAC TROMETHAMINE PER 15 MG: Performed by: ORTHOPAEDIC SURGERY

## 2024-09-05 PROCEDURE — 97116 GAIT TRAINING THERAPY: CPT

## 2024-09-05 PROCEDURE — 94799 UNLISTED PULMONARY SVC/PX: CPT

## 2024-09-05 PROCEDURE — 63710000001 APIXABAN 2.5 MG TABLET: Performed by: ORTHOPAEDIC SURGERY

## 2024-09-05 RX ORDER — ASPIRIN 325 MG
325 TABLET, DELAYED RELEASE (ENTERIC COATED) ORAL DAILY
Qty: 21 TABLET | Refills: 0 | Status: SHIPPED | OUTPATIENT
Start: 2024-09-13

## 2024-09-05 RX ORDER — HYDROCODONE BITARTRATE AND ACETAMINOPHEN 7.5; 325 MG/1; MG/1
1 TABLET ORAL EVERY 4 HOURS PRN
Qty: 36 TABLET | Refills: 0 | Status: SHIPPED | OUTPATIENT
Start: 2024-09-05

## 2024-09-05 RX ADMIN — SODIUM CHLORIDE 2 G: 9 INJECTION, SOLUTION INTRAVENOUS at 02:09

## 2024-09-05 RX ADMIN — KETOROLAC TROMETHAMINE 15 MG: 15 INJECTION, SOLUTION INTRAMUSCULAR; INTRAVENOUS at 02:09

## 2024-09-05 RX ADMIN — KETOROLAC TROMETHAMINE 15 MG: 15 INJECTION, SOLUTION INTRAMUSCULAR; INTRAVENOUS at 08:40

## 2024-09-05 RX ADMIN — FAMOTIDINE 40 MG: 20 TABLET ORAL at 08:40

## 2024-09-05 RX ADMIN — APIXABAN 2.5 MG: 2.5 TABLET, FILM COATED ORAL at 08:40

## 2024-09-05 RX ADMIN — FERROUS SULFATE TAB 325 MG (65 MG ELEMENTAL FE) 325 MG: 325 (65 FE) TAB at 08:40

## 2024-09-05 RX ADMIN — PANTOPRAZOLE SODIUM 40 MG: 40 TABLET, DELAYED RELEASE ORAL at 08:40

## 2024-09-05 RX ADMIN — Medication 3 ML: at 08:41

## 2024-09-05 NOTE — PROGRESS NOTES
[facility]   Hospitalist Progress Note       Patient Name: Mi Keane  : 1945  MRN: [id number]  Primary Care Physician: Lalo Yeboah MD  Date of admission: 2024  Today's Date: 2024  Room / Bed:   230/1  Subjective   Chief Complaint: Medical management     HPI:     Mi Keane is a 78 y.o. female who is being seen by hospitalist service for general medical management of chronic medical issues including GERD.  She also has osteoarthritis right hip and had right hip replacement 24 by Dr. Noguera.  Recent lab work shows creatinine 0.94, hemoglobin 15.2, platelets 336, A1c 5.7.  Resting EKG 24 shows normal sinus rhythm rate 73 bpm.  Nonspecific intraventricular conduction delay.  Rightward axis.  Current vital signs include /67, RR 16, T97.3, O2 sats 100% on room air.  Currently resting in recliner.     Interval Followup: 2024    No nausea or vomiting this morning.  No dyspepsia.  BP 150s/80s range  Right hip pain reasonably controlled on current oral medications  Morning creatinine 0.9  Tolerating physical therapy requirements.  Eager to return home      REVIEW OF SYSTEMS:   Right hip pain  Objective   Temp:  [97.3 °F (36.3 °C)-98.1 °F (36.7 °C)] 97.7 °F (36.5 °C)  Heart Rate:  [] 100  Resp:  [16] 16  BP: (128-158)/(70-88) 152/84  PHYSICAL EXAM   CON: WN. WD. NAD.   NECK:  No thyromegaly. No stridor.   RESP:  CTA. No wheezes. No crackles.  CV:  Rhythm regular. Rate WNL. No murmur noted.  No edema.  GI:  Soft and nontender. Nondistended.    EXT: RLE intact neurovascularly  PSYCH:  Alert. Oriented. Normal affect and mood.  NEURO:  No dysarthria or aphasia.  SKIN: No chronic venous stasis changes or varicosities.  No cellulitis  Results from last 7 days   Lab Units 24  0311 24  1433   WBC 10*3/mm3 8.52  --    HEMOGLOBIN g/dL 11.5* 12.7   HEMATOCRIT % 35.0 38.5   PLATELETS 10*3/mm3 265  --      Results from last 7 days   Lab Units 24  1912    SODIUM mmol/L 136   POTASSIUM mmol/L 4.5   CO2 mmol/L 23.6   CHLORIDE mmol/L 104   ANION GAP mmol/L 8.4   BUN mg/dL 12   CREATININE mg/dL 0.90   GLUCOSE mg/dL 105*         COMPLEXITY OF DATA / DECISION MAKING     []  Moderate: One acute illness or mild exacerbation of chronic or 2 stable chronic or tx side effects   []  High:  Severe acute illness or severe exacerbation of chronic - potential for major debility / life threatening         I have personally reviewed the results from the time of this admission to 9/5/2024 15:13 EDT:  []  Laboratory:  []  Microbiology: []  Radiology:  []  Telemetry:   []  Cardiology/Vascular:  []  Pathology:  []  Prior external records:  []  Independent historian provided additional details:      []  Discussed case with specialists:    []  Independent interpretation of ECG/Imaging etc:             []  Moderate: Rx management, low risk surgery, suboptimal social situation   []  High:  Rx with close monitoring for toxicity, mod-high risk surgery, DNR decision, Comfort initiated, IV pain meds    Assessment / Plan   Assessment:    Medical management  GERD  OA right hip  Right hip replacement 9/4/24 by Dr. Noguera   Plan:    Continue home medical regimen including PPI.  Routine follow-up with PCP.  No change to home medical regimen, other than orthopedic Rx for pain meds and DVT prophylaxis.  Check BP at home daily.  Discussed home readings with PCP at routine follow-up.  Daily PT  Pain med Rx per orthopedist  DVT prophylaxis per orthopedist  VTE Prophylaxis:  Pharmacologic & mechanical VTE prophylaxis orders are present.      CODE STATUS:      Code Status (Patient has no pulse and is not breathing): CPR (Attempt to Resuscitate)  Medical Interventions (Patient has pulse or is breathing): Full Support       Electronically signed by SABRINA Albert, 09/05/24, 3:13 PM EDT.

## 2024-09-05 NOTE — SIGNIFICANT NOTE
09/05/24 0743   Plan   Final Discharge Disposition Code 01 - home or self-care   Final Note ZARI Valdez. Appt: 9/6/24 at 1PM.

## 2024-09-05 NOTE — PLAN OF CARE
Goal Outcome Evaluation:  Plan of Care Reviewed With: patient        Progress: improving (Through ADL/transfer retraining, patient is now able to perform basic ADL and transfers SBA/RW with set up and cues for safe positioning and adaptive techniques.)  Outcome Evaluation: Patient presents with limitations of balance and standing endurance/activity tolerance which impede her ability to perform ADL and transfers as prior.  The skills of a therapist will be required to safely and effectively implement treatment plan to restore maximal level of function.      Anticipated Discharge Disposition (OT): home with assist (Outpatient PT)

## 2024-09-05 NOTE — PROGRESS NOTES
[facility]     Progress Note    Patient Name: Mi Keane  : 1945  MRN: [id number]  Primary Care Physician:  Lalo Yeboah MD  Date of admission: 2024    Subjective   Subjective     HPI:  Patient Reports doing very well this morning.  She has been working with therapy and denies any chest pain or shortness of air.  Her pain is controlled.    Review of Systems   See HPI    Objective   Objective     Vitals:   Temp:  [96.2 °F (35.7 °C)-98.2 °F (36.8 °C)] 98.1 °F (36.7 °C)  Heart Rate:  [56-85] 82  Resp:  [12-18] 16  BP: ()/(58-89) 157/88  Flow (L/min):  [2] 2  Physical Exam    General: Alert, no acute distress   Musculoskeletal: Neurovascular intact extremity.  Dressing intact.  Negative Lakhwinder.  Positive pulses.    Result Review      WBC   Date Value Ref Range Status   2024 8.52 3.40 - 10.80 10*3/mm3 Final     RBC   Date Value Ref Range Status   2024 3.83 3.77 - 5.28 10*6/mm3 Final     Hemoglobin   Date Value Ref Range Status   2024 11.5 (L) 12.0 - 15.9 g/dL Final     Hematocrit   Date Value Ref Range Status   2024 35.0 34.0 - 46.6 % Final     MCV   Date Value Ref Range Status   2024 91.4 79.0 - 97.0 fL Final     MCH   Date Value Ref Range Status   2024 30.0 26.6 - 33.0 pg Final     MCHC   Date Value Ref Range Status   2024 32.9 31.5 - 35.7 g/dL Final     RDW   Date Value Ref Range Status   2024 12.7 12.3 - 15.4 % Final     RDW-SD   Date Value Ref Range Status   2024 41.8 37.0 - 54.0 fl Final     MPV   Date Value Ref Range Status   2024 8.9 6.0 - 12.0 fL Final     Platelets   Date Value Ref Range Status   2024 265 140 - 450 10*3/mm3 Final        Result Review:  I have personally reviewed the results from the time of this admission to 2024 07:44 EDT and agree with these findings:  [x]  Laboratory  []  Microbiology  [x]  Radiology  []  EKG/Telemetry   []  Cardiology/Vascular   []  Pathology  []  Old records  []   Other:      Assessment & Plan   Assessment / Plan     Brief Patient Summary:  Mi Keane is a 78 y.o. female who is status post right total hip arthroplasty    Active Hospital Problems:  Active Hospital Problems    Diagnosis     **Primary osteoarthritis of right hip     Arthritis of right hip      Plan: Weightbearing as tolerated with walker  Physical and Occupational Therapy  Pain control  DVT prophylaxis  Discharge planning: Home later today       VTE Prophylaxis:  Pharmacologic & mechanical VTE prophylaxis orders are present.        CODE STATUS:   Code Status (Patient has no pulse and is not breathing): CPR (Attempt to Resuscitate)  Medical Interventions (Patient has pulse or is breathing): Full Support    Disposition:  I expect patient to be discharged later today.    Electronically signed by Talat Noguera MD, 09/05/24, 7:44 AM EDT.

## 2024-09-05 NOTE — THERAPY EVALUATION
Patient Name: Mi Keane  : 1945    MRN: [id number]                              Today's Date: 2024       Admit Date: 2024    Visit Dx:     ICD-10-CM ICD-9-CM   1. Difficulty in walking  R26.2 719.7   2. Primary osteoarthritis of right hip  M16.11 715.15   3. Decreased activities of daily living (ADL)  Z78.9 V49.89     Patient Active Problem List   Diagnosis    Arthritis of right hip    Primary osteoarthritis of right hip     Past Medical History:   Diagnosis Date    Cancer     GERD (gastroesophageal reflux disease)     Skin cancer     Basal cell carcinoma on back     Past Surgical History:   Procedure Laterality Date    BACK SURGERY  2019    laminectomy    COLONOSCOPY      ENDOSCOPY      ENDOSCOPY      with esophageal dilatation    LAMINECTOMY      TOTAL HIP ARTHROPLASTY Right 2024    Procedure: RIGHT TOTAL HIP ARTHROPLASTY ANTERIOR;  Surgeon: Talat Noguera MD;  Location: [facility] MAIN OR;  Service: Orthopedics;  Laterality: Right;      General Information       Row Name 24 1511 24 1505       OT Time and Intention    Document Type therapy note (daily note)  -AV evaluation  -AV    Mode of Treatment individual therapy;occupational therapy  -AV individual therapy;occupational therapy  -AV      Row Name 24 1505          General Information    Patient Profile Reviewed yes  -AV     Prior Level of Function independent:;ADL's;cleaning;transfer;all household mobility  Stands to shower (walk-in shower with seat available).  Stands at sink to groom.  Standard commode.  Ambulates without assistive device.  No home oxygen.  -AV     Existing Precautions/Restrictions fall  WBAT  -AV     Barriers to Rehab none identified  -AV       Row Name 24 1506          Occupational Profile    Reason for Services/Referral (Occupational Profile) Patient is a 78 year old female admitted to [facility] on 2024 with right hip pain.  She is currently postop day 1: Right total  hip replacement with anterior approach.  She is on 2 N./room air.   OT consulted due to recent decline in ADL/transfer independence.  No previous OT services for current condition.  -AV       Row Name 09/05/24 1505          Living Environment    People in Home spouse  -AV       Row Name 09/05/24 1505          Home Main Entrance    Number of Stairs, Main Entrance four  -AV       Row Name 09/05/24 1505          Stairs Within Home, Primary    Number of Stairs, Within Home, Primary none  -AV       Row Name 09/05/24 1511 09/05/24 1505       Cognition    Orientation Status (Cognition) --  Receptive to cues for safe positioning and adaptive techniques throughout ADL session  -AV --  Patient is alert, very pleasant and cooperative- able to retain information and follow commands.  -AV      Row Name 09/05/24 1505          Safety Issues, Functional Mobility    Impairments Affecting Function (Mobility) balance;endurance/activity tolerance  -AV               User Key  (r) = Recorded By, (t) = Taken By, (c) = Cosigned By      Initials Name Provider Type    AV Jose F Lemon, OT Occupational Therapist                     Mobility/ADL's       Row Name 09/05/24 1511 09/05/24 1506       Transfers    Transfers sit-stand transfer  -AV --    Comment, (Transfers) -- SBA/RW  -AV      Row Name 09/05/24 1511          Sit-Stand Transfer    Sit-Stand Johnson (Transfers) standby assist;verbal cues  Cues for safe hand placement  -AV     Assistive Device (Sit-Stand Transfers) walker, front-wheeled  -AV     Comment, (Sit-Stand Transfer) X 4 during ADL session  -AV       Row Name 09/05/24 1511 09/05/24 1506       Activities of Daily Living    BADL Assessment/Intervention --  Independent upper body bathing/dressing with set up while seated.  SBA don/doff pants.  Independent donning/doffing socks and donning lace up shoes.  Cues for safe positioning/adaptive techniques.  -AV --  Independent feeding, grooming and upper body bathing/dressing with  set up while seated.  SBA lower body bathing/dressing and toilet hygiene using elevated commode.  -AV      Row Name 09/05/24 1506          Mobility    Extremity Weight-bearing Status right lower extremity  -AV     Right Lower Extremity (Weight-bearing Status) weight-bearing as tolerated (WBAT)  -AV               User Key  (r) = Recorded By, (t) = Taken By, (c) = Cosigned By      Initials Name Provider Type    Jose F Lyons OT Occupational Therapist                   Obj/Interventions       Row Name 09/05/24 1506          Sensory Assessment (Somatosensory)    Sensory Assessment (Somatosensory) UE sensation intact  -AV       Row Name 09/05/24 1506          Vision Assessment/Intervention    Visual Impairment/Limitations WFL;corrective lenses full-time  -AV       Row Name 09/05/24 1506          Range of Motion Comprehensive    General Range of Motion bilateral upper extremity ROM WFL  -AV     Comment, General Range of Motion AROM  -AV       Row Name 09/05/24 1506          Strength Comprehensive (MMT)    Comment, General Manual Muscle Testing (MMT) Assessment 4+/5 bilateral biceps, triceps and   -AV       Row Name 09/05/24 1506          Motor Skills    Motor Skills coordination;functional endurance  -AV     Coordination WFL  Right dominant  -AV     Functional Endurance Fair  -AV       Row Name 09/05/24 1512 09/05/24 1506       Balance    Balance Assessment sitting dynamic balance;sit to stand dynamic balance;standing dynamic balance  -AV --    Dynamic Sitting Balance independent  -AV --    Sit to Stand Dynamic Balance standby assist  -AV --    Dynamic Standing Balance standby assist  -AV --    Balance Interventions sit to stand;standing;minimal challenge;occupation based/functional task  -AV --    Comment, Balance -- SBA/RW  -AV              User Key  (r) = Recorded By, (t) = Taken By, (c) = Cosigned By      Initials Name Provider Type    Jose F Lyons OT Occupational Therapist                   Goals/Plan        Row Name 09/05/24 1508          Transfer Goal 1 (OT)    Activity/Assistive Device (Transfer Goal 1, OT) transfers, all;walker, rolling  -AV     O'Brien Level/Cues Needed (Transfer Goal 1, OT) modified independence  -AV     Time Frame (Transfer Goal 1, OT) long term goal (LTG);10 days  -AV       Row Name 09/05/24 1508          Bathing Goal 1 (OT)    Activity/Device (Bathing Goal 1, OT) bathing skills, all;shower chair  -AV     O'Brien Level/Cues Needed (Bathing Goal 1, OT) modified independence  -AV     Time Frame (Bathing Goal 1, OT) long term goal (LTG);10 days  -AV       Row Name 09/05/24 1508          Dressing Goal 1 (OT)    Activity/Device (Dressing Goal 1, OT) dressing skills, all  -AV     O'Brien/Cues Needed (Dressing Goal 1, OT) modified independence  -AV     Time Frame (Dressing Goal 1, OT) long term goal (LTG);10 days  -AV       Row Name 09/05/24 1508          Toileting Goal 1 (OT)    Activity/Device (Toileting Goal 1, OT) toileting skills, all;raised toilet seat  -AV     O'Brien Level/Cues Needed (Toileting Goal 1, OT) modified independence  -AV     Time Frame (Toileting Goal 1, OT) long term goal (LTG);10 days  -AV       Row Name 09/05/24 1508          Grooming Goal 1 (OT)    Activity/Device (Grooming Goal 1, OT) grooming skills, all  Standing at sink  -AV     O'Brien (Grooming Goal 1, OT) modified independence  -AV     Time Frame (Grooming Goal 1, OT) long term goal (LTG);10 days  -AV       Row Name 09/05/24 1508          Problem Specific Goal 1 (OT)    Problem Specific Goal 1 (OT) Patient will demonstrate good standing endurance/activity tolerance needed to support ADLs.  -AV     Time Frame (Problem Specific Goal 1, OT) long term goal (LTG);10 days  -AV       Row Name 09/05/24 1508          Therapy Assessment/Plan (OT)    Planned Therapy Interventions (OT) activity tolerance training;BADL retraining;functional balance retraining;occupation/activity based  interventions;patient/caregiver education/training;transfer/mobility retraining  -AV               User Key  (r) = Recorded By, (t) = Taken By, (c) = Cosigned By      Initials Name Provider Type    Jose F Lyons, CHARLES Occupational Therapist                   Clinical Impression       Row Name 09/05/24 0882          Pain Scale: FACES Pre/Post-Treatment    Pain: FACES Scale, Pretreatment 2-->hurts little bit  -AV     Posttreatment Pain Rating 2-->hurts little bit  -AV     Pain Location - Side/Orientation Right  -AV     Pain Location - hip  -AV       Row Name 09/05/24 1500          Plan of Care Review    Plan of Care Reviewed With patient  -AV     Progress improving  Through ADL/transfer retraining, patient is now able to perform basic ADL and transfers SBA/RW with set up and cues for safe positioning and adaptive techniques.  -AV     Outcome Evaluation Patient presents with limitations of balance and standing endurance/activity tolerance which impede her ability to perform ADL and transfers as prior.  The skills of a therapist will be required to safely and effectively implement treatment plan to restore maximal level of function.  -AV       Row Name 09/05/24 1500          Therapy Assessment/Plan (OT)    Patient/Family Therapy Goal Statement (OT) Get back to life  -AV     Rehab Potential (OT) good, to achieve stated therapy goals  -AV     Criteria for Skilled Therapeutic Interventions Met (OT) yes;meets criteria;skilled treatment is necessary  -AV     Therapy Frequency (OT) 5 times/wk  -AV       Row Name 09/05/24 1500          Therapy Plan Review/Discharge Plan (OT)    Equipment Needs Upon Discharge (OT) walker, rolling;commode chair  -AV     Anticipated Discharge Disposition (OT) home with assist  Outpatient PT  -AV       Row Name 09/05/24 1508          Vital Signs    O2 Delivery Pre Treatment room air  -AV     O2 Delivery Intra Treatment room air  -AV     O2 Delivery Post Treatment room air  -AV       Row Name  09/05/24 1507          Positioning and Restraints    Pre-Treatment Position sitting in chair/recliner  -AV     Post Treatment Position chair  -AV     In Chair reclined;call light within reach;encouraged to call for assist;exit alarm on  -AV               User Key  (r) = Recorded By, (t) = Taken By, (c) = Cosigned By      Initials Name Provider Type    AV Jose F Lemon, CHARLES Occupational Therapist                   Outcome Measures       Row Name 09/05/24 1509          How much help from another is currently needed...    Putting on and taking off regular lower body clothing? 3  -AV     Bathing (including washing, rinsing, and drying) 3  -AV     Toileting (which includes using toilet bed pan or urinal) 3  -AV     Putting on and taking off regular upper body clothing 4  -AV     Taking care of personal grooming (such as brushing teeth) 4  -AV     Eating meals 4  -AV     AM-PAC 6 Clicks Score (OT) 21  -AV       Row Name 09/05/24 1400 09/05/24 0840       How much help from another person do you currently need...    Turning from your back to your side while in flat bed without using bedrails? 3  -RH 3  -KD    Moving from lying on back to sitting on the side of a flat bed without bedrails? 3  -RH 3  -KD    Moving to and from a bed to a chair (including a wheelchair)? 3  -RH 3  -KD    Standing up from a chair using your arms (e.g., wheelchair, bedside chair)? 3  -RH 3  -KD    Climbing 3-5 steps with a railing? 3  -RH 3  -KD    To walk in hospital room? 4  -RH 3  -KD    AM-PAC 6 Clicks Score (PT) 19  -RH 18  -KD    Highest Level of Mobility Goal 6 --> Walk 10 steps or more  -RH 6 --> Walk 10 steps or more  -KD      Row Name 09/05/24 1509          Functional Assessment    Outcome Measure Options AM-PAC 6 Clicks Daily Activity (OT);Optimal Instrument  -AV       Row Name 09/05/24 1509          Optimal Instrument    Optimal Instrument Optimal - 3  -AV     Bending/Stooping 1  -AV     Standing 2  -AV     Reaching 1  -AV     From the  list, choose the 3 activities you would most like to be able to do without any difficulty Bending/stooping;Standing;Reaching  -AV     Total Score Optimal - 3 4  -AV               User Key  (r) = Recorded By, (t) = Taken By, (c) = Cosigned By      Initials Name Provider Type    RH Sae Peng, PTA Physical Therapist Assistant    Jose F Lyons OT Occupational Therapist    Lory Salvador, RN Registered Nurse                    Occupational Therapy Education       Title: PT OT SLP Therapies (Done)       Topic: Occupational Therapy (Done)       Point: ADL training (Done)       Description:   Instruct learner(s) on proper safety adaptation and remediation techniques during self care or transfers.   Instruct in proper use of assistive devices.                  Learning Progress Summary             Patient Acceptance, E, VU by AV at 9/5/2024 1509    Comment: WBAT  Need for staff assistance for all standing ADL/transfers  Safe transfer techniques  Safe positioning for ADLs  Adaptive techniques for lower body ADLs  ADL home safety/ adaptive equipment recommendations                         Point: Home exercise program (Done)       Description:   Instruct learner(s) on appropriate technique for monitoring, assisting and/or progressing therapeutic exercises/activities.                  Learning Progress Summary             Patient Acceptance, E, VU by AV at 9/5/2024 1509    Comment: WBAT  Need for staff assistance for all standing ADL/transfers  Safe transfer techniques  Safe positioning for ADLs  Adaptive techniques for lower body ADLs  ADL home safety/ adaptive equipment recommendations                         Point: Precautions (Done)       Description:   Instruct learner(s) on prescribed precautions during self-care and functional transfers.                  Learning Progress Summary             Patient Acceptance, E, VU by AV at 9/5/2024 1509    Comment: WBAT  Need for staff assistance for all standing  ADL/transfers  Safe transfer techniques  Safe positioning for ADLs  Adaptive techniques for lower body ADLs  ADL home safety/ adaptive equipment recommendations                         Point: Body mechanics (Done)       Description:   Instruct learner(s) on proper positioning and spine alignment during self-care, functional mobility activities and/or exercises.                  Learning Progress Summary             Patient Acceptance, E, VU by HELADIO at 9/5/2024 7308    Comment: WBAT  Need for staff assistance for all standing ADL/transfers  Safe transfer techniques  Safe positioning for ADLs  Adaptive techniques for lower body ADLs  ADL home safety/ adaptive equipment recommendations                                         User Key       Initials Effective Dates Name Provider Type Discipline    HELADIO 06/16/21 -  Jose F Lemon, CHARLES Occupational Therapist OT                  OT Recommendation and Plan  Planned Therapy Interventions (OT): activity tolerance training, BADL retraining, functional balance retraining, occupation/activity based interventions, patient/caregiver education/training, transfer/mobility retraining  Therapy Frequency (OT): 5 times/wk  Plan of Care Review  Plan of Care Reviewed With: patient  Progress: improving (Through ADL/transfer retraining, patient is now able to perform basic ADL and transfers SBA/RW with set up and cues for safe positioning and adaptive techniques.)  Outcome Evaluation: Patient presents with limitations of balance and standing endurance/activity tolerance which impede her ability to perform ADL and transfers as prior.  The skills of a therapist will be required to safely and effectively implement treatment plan to restore maximal level of function.     Time Calculation:   Evaluation Complexity (OT)  Review Occupational Profile/Medical/Therapy History Complexity: expanded/moderate complexity  Assessment, Occupational Performance/Identification of Deficit Complexity: 1-3 performance  deficits  Clinical Decision Making Complexity (OT): problem focused assessment/low complexity  Overall Complexity of Evaluation (OT): low complexity     Time Calculation- OT       Row Name 09/05/24 1510 09/05/24 1438          Time Calculation- OT    OT Received On 09/05/24  -AV --     OT Goal Re-Cert Due Date 09/14/24  -AV --        Timed Charges    19001 - Gait Training Minutes  -- 8  -RH     44742 - OT Self Care/Mgmt Minutes 25  -AV --        Untimed Charges    OT Eval/Re-eval Minutes 32  -AV --        Total Minutes    Timed Charges Total Minutes 25  -AV 8  -RH     Untimed Charges Total Minutes 32  -AV --      Total Minutes 57  -AV 8  -RH               User Key  (r) = Recorded By, (t) = Taken By, (c) = Cosigned By      Initials Name Provider Type     Sae Peng, PTA Physical Therapist Assistant    AV Jose F Lemon OT Occupational Therapist                  Therapy Charges for Today       Code Description Service Date Service Provider Modifiers Qty    91129741020 HC OT SELF CARE/MGMT/TRAIN EA 15 MIN 9/5/2024 Jose F Lemon OT GO 2    20131504611 HC OT EVAL LOW COMPLEXITY 3 9/5/2024 Jose F Lemon OT GO 1                 Jose F Lemon OT  9/5/2024

## 2024-09-05 NOTE — TELEPHONE ENCOUNTER
Problem #1 mild persistent asthma with exacerbation ACT score is 18 today    Subjective patient recent upper respiratory infection    Objective episodic wheezing cough and congestion    Assessment exacerbation of asthma    Plan on Singulair and DuoNeb's 4 times daily with as needed Ventolin inhaler 4 times daily            Problem #2 type 2 diabetes mellitus    This is been well controlled in the past     takes glipizide and Actos and metformin    DIET FORM FILLED IN     THREE MONTH GLUCOSE AVERAGE NEEDS TO DONE IN 6 WEEKS     WALK IN PLACE 15 MINUTES WITH EACH MEAL                  Problem #3 BPH symptoms and elevated PSA antigen test     On finasteride and tamsulisin    On tea        Problem #4 osteoarthritis involving both knees    Chronic low back pain    IMPROVED     BILATERAL KNEE PAIN PAINFUL     DISCUSSION RE INJECTION          RE FAXED & READY FOR P/U AT THE .    HUB ABLE TO RELAY TE.

## 2024-09-05 NOTE — DISCHARGE INSTR - APPOINTMENTS
Outpatient PT  Lv Valdez  APPT: 9/6/24 @ 1:00    PCP Follow-up  Dr. Yeboah  [phone]  APPT: 9/24/24 @ 10:00

## 2024-09-05 NOTE — PLAN OF CARE
Goal Outcome Evaluation:  Plan of Care Reviewed With: patient        Progress: improving  Outcome Evaluation: Pt alert x4. Pt had scheduled pain medication. Assist x1 with walker/gait belt to the bedside commode. Plan is to discharge today.

## 2024-09-05 NOTE — THERAPY TREATMENT NOTE
Acute Care - Physical Therapy Treatment Note  formerly [facility]     Patient Name: Mi Keane  : 1945  MRN: [id number]  Today's Date: 2024   Onset of Illness/Injury or Date of Surgery: 24  Visit Dx:     ICD-10-CM ICD-9-CM   1. Difficulty in walking  R26.2 719.7   2. Primary osteoarthritis of right hip  M16.11 715.15     Patient Active Problem List   Diagnosis    Arthritis of right hip    Primary osteoarthritis of right hip     Past Medical History:   Diagnosis Date    Cancer     GERD (gastroesophageal reflux disease)     Skin cancer     Basal cell carcinoma on back     Past Surgical History:   Procedure Laterality Date    BACK SURGERY  2019    laminectomy    COLONOSCOPY      ENDOSCOPY      ENDOSCOPY      with esophageal dilatation    LAMINECTOMY      TOTAL HIP ARTHROPLASTY Right 2024    Procedure: RIGHT TOTAL HIP ARTHROPLASTY ANTERIOR;  Surgeon: Talat Noguera MD;  Location: [facility] OR;  Service: Orthopedics;  Laterality: Right;     PT Assessment (Last 12 Hours)       PT Evaluation and Treatment       Row Name 24 1439          Physical Therapy Time and Intention    Subjective Information no complaints  -RH     Document Type therapy note (daily note)  -     Mode of Treatment physical therapy;group therapy;individual therapy  -     Patient Effort good  -RH     Comment Gait training performed individually; therapeutic exercises performed in a group setting with 4 participants  -       Row Name 24 1439          General Information    Patient Observations alert;cooperative;agree to therapy  -       Row Name 24 1439          Pain    Additional Documentation Pain Scale: FACES Pre/Post-Treatment (Group)  -       Row Name 24 1439          Pain Scale: FACES Pre/Post-Treatment    Pain: FACES Scale, Pretreatment 0-->no hurt  -RH     Posttreatment Pain Rating 0-->no hurt  -RH     Pain Location - Side/Orientation Right  -     Pain Location - hip  -       Row  Name 09/05/24 1439          Range of Motion (ROM)    Range of Motion --  Pt R hip AAROM at 90 degrees flex and 20 degrees abd.  -       Row Name 09/05/24 1439          Strength (Manual Muscle Testing)    Strength (Manual Muscle Testing) --  Pt R hip flex strength at 3-/5.  -       Row Name 09/05/24 1439          Mobility    Extremity Weight-bearing Status right lower extremity  -RH     Right Lower Extremity (Weight-bearing Status) weight-bearing as tolerated (WBAT)  -RH       Row Name 09/05/24 1439          Transfers    Transfers sit-stand transfer;stand-sit transfer  -RH       Row Name 09/05/24 1439          Sit-Stand Transfer    Sit-Stand [place] (Transfers) contact guard  -RH     Assistive Device (Sit-Stand Transfers) walker, front-wheeled  -       Row Name 09/05/24 1439          Stand-Sit Transfer    Stand-Sit [place] (Transfers) contact guard  -RH     Assistive Device (Stand-Sit Transfers) walker, front-wheeled  -       Row Name 09/05/24 1439          Gait/Stairs (Locomotion)    Gait/Stairs Locomotion gait/ambulation assistive device  -RH     [place] Level (Gait) contact guard  -RH     Assistive Device (Gait) walker, front-wheeled  -RH     Patient was able to Ambulate yes  -RH     Distance in Feet (Gait) 165  -RH     Pattern (Gait) --  Pt able to achieve and maintain reciprocal gait.  -RH     Deviations/Abnormal Patterns (Gait) gait speed decreased;stride length decreased  -RH     Right Sided Gait Deviations heel strike decreased  -RH     Gait Assessment/Intervention Pt amb with RW and CGA with reciprocal gait with decreased gait speed, stride length, and RLE heel strike.  -RH     Negotiation (Stairs) stairs independence;stairs assistive device;handrail location;number of steps;ascending technique;descending technique  -RH     [place] Level (Stairs) contact guard  -RH     Handrail Location (Stairs) both sides  -RH     Number of Steps (Stairs) 5 x 2  -RH     Ascending Technique  (Stairs) step-to-step  -     Descending Technique (Stairs) step-to-step  -       Row Name 09/05/24 1439          Safety Issues, Functional Mobility    Impairments Affecting Function (Mobility) balance;pain;range of motion (ROM);strength  -       Row Name 09/05/24 1439          Balance    Balance Assessment standing dynamic balance  -     Dynamic Standing Balance contact guard  -     Position/Device Used, Standing Balance walker, front-wheeled  -       Row Name 09/05/24 1439          Motor Skills    Therapeutic Exercise knee;hip;ankle  -       Row Name 09/05/24 1439          Hip (Therapeutic Exercise)    Hip (Therapeutic Exercise) isometric exercises  -     Hip Isometrics (Therapeutic Exercise) right;gluteal sets;aBduction;10 repetitions;2 sets  -       Row Name 09/05/24 1439          Knee (Therapeutic Exercise)    Knee (Therapeutic Exercise) isometric exercises;strengthening exercise  -     Knee Isometrics (Therapeutic Exercise) right;quad sets;10 repetitions;2 sets  -     Knee Strengthening (Therapeutic Exercise) right;heel slides;SAQ (short arc quad);LAQ (long arc quad);10 repetitions;2 sets  -       Row Name 09/05/24 1439          Ankle (Therapeutic Exercise)    Ankle (Therapeutic Exercise) AROM (active range of motion)  -     Ankle AROM (Therapeutic Exercise) right;dorsiflexion;plantarflexion;10 repetitions;2 sets  -       Row Name             Wound 09/04/24 Right thigh Incision    Wound - Properties Group Placement Date: 09/04/24  -SF Present on Original Admission: N  -SF Side: Right  -SF Location: thigh  -SF Primary Wound Type: Incision  -SF    Retired Wound - Properties Group Placement Date: 09/04/24  -SF Present on Original Admission: N  -SF Side: Right  -SF Location: thigh  -SF Primary Wound Type: Incision  -SF    Retired Wound - Properties Group Date first assessed: 09/04/24  -SF Present on Original Admission: N  -SF Side: Right  -SF Location: thigh  -SF Primary Wound Type:  Incision  -SF      Row Name 09/05/24 1439          Positioning and Restraints    Post Treatment Position chair  -RH     In Chair reclined;call light within reach;encouraged to call for assist;exit alarm on  -RH       Row Name 09/05/24 1439          Progress Summary (PT)    Progress Toward Functional Goals (PT) progress toward functional goals is good  -     Daily Progress Summary (PT) Pt is progressing well with their exercise program.  Will continue current plan of care.  -               User Key  (r) = Recorded By, (t) = Taken By, (c) = Cosigned By      Initials Name Provider Type    SF Betty Rollins, RN Registered Nurse    RH Sae Peng PTA Physical Therapist Assistant                    Physical Therapy Education       Title: PT OT SLP Therapies (Resolved)       Topic: Physical Therapy (Resolved)       Point: Mobility training (Resolved)       Learning Progress Summary             Patient Acceptance, E,TB, VU by  at 9/4/2024 1540                                         User Key       Initials Effective Dates Name Provider Type Discipline     08/27/24 -  Brian Thomas, PT Student PT Student PT                  PT Recommendation and Plan     Progress Summary (PT)  Progress Toward Functional Goals (PT): progress toward functional goals is good  Daily Progress Summary (PT): Pt is progressing well with their exercise program.  Will continue current plan of care.   Outcome Measures       Row Name 09/05/24 1400 09/04/24 1500          How much help from another person do you currently need...    Turning from your back to your side while in flat bed without using bedrails? 3  - 4  -LICHA (r) EW (t) LICHA (c)     Moving from lying on back to sitting on the side of a flat bed without bedrails? 3  - 4  -LICHA (r) EW (t) LICHA (c)     Moving to and from a bed to a chair (including a wheelchair)? 3  - 4  -LICHA (r) EW (t) LICHA (c)     Standing up from a chair using your arms (e.g., wheelchair, bedside chair)? 3  - 4   -LICHA (r) EW (t) LICHA (c)     Climbing 3-5 steps with a railing? 3  -RH 3  -LICHA (r) EW (t) LICHA (c)     To walk in hospital room? 4  -RH 4  -LICHA (r) EW (t) LICHA (c)     AM-PAC 6 Clicks Score (PT) 19  -RH 23  -LICHA (r) EW (t)     Highest Level of Mobility Goal 6 --> Walk 10 steps or more  -RH 7 --> Walk 25 feet or more  -LICHA (r) EW (t)               User Key  (r) = Recorded By, (t) = Taken By, (c) = Cosigned By      Initials Name Provider Type     Sae Peng PTA Physical Therapist Assistant    Pineda Hernandez, PT Physical Therapist    Brian Presley, PT Student PT Student                     Time Calculation:    PT Charges       Row Name 09/05/24 1438             Time Calculation    PT Received On 09/05/24  -RH         Timed Charges    51118 - Gait Training Minutes  8  -RH      85572 - PT Therapeutic Activity Minutes 4  -RH         Untimed Charges    PT Group Therapy Minutes 30  -RH         Total Minutes    Timed Charges Total Minutes 12  -RH      Untimed Charges Total Minutes 30  -RH       Total Minutes 42  -RH                User Key  (r) = Recorded By, (t) = Taken By, (c) = Cosigned By      Initials Name Provider Type     Sae Peng PTA Physical Therapist Assistant                  Therapy Charges for Today       Code Description Service Date Service Provider Modifiers Qty    37504788919 HC GAIT TRAINING EA 15 MIN 9/5/2024 Sea Peng PTA GP 1    28110409319 HC PT THER PROC GROUP 9/5/2024 Sae Peng PTA GP 1            PT G-Codes  AM-PAC 6 Clicks Score (PT): 19    Sae Peng PTA  9/5/2024

## 2024-09-05 NOTE — PLAN OF CARE
Goal Outcome Evaluation:              Outcome Evaluation: Patient alert and oriented x 4, pleasant, calm, and coopertative.  VS WNL.  LCTA.  No complaints of pain during assessment.  Dsg to R hip clean, dry, and intact.  Patient participated in therapy services.  Discharge instructions provided and reviewed with patient.  Patient states understanding.  Patient discharged home with all personal belongings with  and son via POV.

## 2024-09-17 ENCOUNTER — OFFICE VISIT (OUTPATIENT)
Dept: ORTHOPEDIC SURGERY | Facility: CLINIC | Age: 79
End: 2024-09-17
Payer: MEDICARE

## 2024-09-17 VITALS
DIASTOLIC BLOOD PRESSURE: 90 MMHG | HEIGHT: 65 IN | OXYGEN SATURATION: 96 % | BODY MASS INDEX: 18.99 KG/M2 | WEIGHT: 114 LBS | HEART RATE: 91 BPM | SYSTOLIC BLOOD PRESSURE: 174 MMHG

## 2024-09-17 DIAGNOSIS — M25.551 RIGHT HIP PAIN: Primary | ICD-10-CM

## 2024-09-17 DIAGNOSIS — Z96.641 S/P TOTAL RIGHT HIP ARTHROPLASTY: ICD-10-CM

## 2024-09-17 RX ORDER — ACETAMINOPHEN 500 MG
500 TABLET ORAL EVERY 6 HOURS PRN
COMMUNITY

## 2024-09-17 RX ORDER — LIDOCAINE HYDROCHLORIDE 10 MG/ML
5 INJECTION, SOLUTION INFILTRATION; PERINEURAL
Status: COMPLETED | OUTPATIENT
Start: 2024-09-17 | End: 2024-09-17

## 2024-09-17 RX ADMIN — LIDOCAINE HYDROCHLORIDE 5 ML: 10 INJECTION, SOLUTION INFILTRATION; PERINEURAL at 09:46

## 2024-10-17 ENCOUNTER — OFFICE VISIT (OUTPATIENT)
Dept: ORTHOPEDIC SURGERY | Facility: CLINIC | Age: 79
End: 2024-10-17
Payer: MEDICARE

## 2024-10-17 VITALS
WEIGHT: 114 LBS | HEIGHT: 65 IN | HEART RATE: 81 BPM | OXYGEN SATURATION: 98 % | DIASTOLIC BLOOD PRESSURE: 84 MMHG | SYSTOLIC BLOOD PRESSURE: 162 MMHG | BODY MASS INDEX: 18.99 KG/M2

## 2024-10-17 DIAGNOSIS — Z47.1 AFTERCARE FOLLOWING RIGHT HIP JOINT REPLACEMENT SURGERY: Primary | ICD-10-CM

## 2024-10-17 DIAGNOSIS — Z96.641 AFTERCARE FOLLOWING RIGHT HIP JOINT REPLACEMENT SURGERY: Primary | ICD-10-CM

## 2024-10-17 PROCEDURE — 99024 POSTOP FOLLOW-UP VISIT: CPT | Performed by: ORTHOPAEDIC SURGERY

## 2024-10-17 NOTE — PROGRESS NOTES
"Chief Complaint  Follow-up of the Right Hip     Subjective      Mi Keane presents to [facility] for a follow up for her right hip. She recently underwent a right hip arthroplasty performed on 09/04/24. She presents today without any assistance devices. She is happy with how her hip is doing. She reports she has been discharged from physical therapy and has been doing home exercises.      No Known Allergies     Social History     Socioeconomic History    Marital status:    Tobacco Use    Smoking status: Never    Smokeless tobacco: Never   Vaping Use    Vaping status: Never Used   Substance and Sexual Activity    Alcohol use: Never    Drug use: Never    Sexual activity: Defer        I reviewed the patient's chief complaint, history of present illness, review of systems, past medical history, surgical history, family history, social history, medications, and allergy list.     Review of Systems     Constitutional: Denies fevers, chills, weight loss  Cardiovascular: Denies chest pain, shortness of breath  Skin: Denies rashes, acute skin changes  Neurologic: Denies headache, loss of consciousness  MSK: right hip pain       Vital Signs:   /84   Pulse 81   Ht 165.1 cm (65\")   Wt 51.7 kg (114 lb)   SpO2 98%   BMI 18.97 kg/m²            Ortho Exam    Physical Exam  General:Alert. No acute distress     Right upper extremity: flexion to 90 degrees, external rotation  to 25 degrees, internal rotation to 10 degrees, negative  straight leg raise, well healed surgical incision, no swelling, no redness, neurovascularly intact, calf soft, positive EHL, FHL, GS, and TA. Sensation intact to all 5 nerves of the foot.     Procedures    Imaging Results (Most Recent)       None             Result Review :       No results found.           Assessment and Plan     Diagnoses and all orders for this visit:    1. Aftercare following right hip joint replacement surgery (Primary)        The " patient presents here today for a follow up for her right hip arthroplasty performed on 09/04/24.     She is overall doing well and has no complaints. She will continue home exercises and over the counter medications for pain control.     Call or return if worsening symptoms.    Follow Up     8 weeks       Patient was given instructions and counseling regarding her condition or for health maintenance advice. Please see specific information pulled into the AVS if appropriate.     Scribed for Talat Noguera MD by Nemo Martinez.  10/17/24   11:41 EDT    I have personally performed the services described in this document as scribed by the above individual and it is both accurate and complete. Talat Noguera MD 10/17/24

## 2024-12-12 ENCOUNTER — OFFICE VISIT (OUTPATIENT)
Dept: ORTHOPEDIC SURGERY | Facility: CLINIC | Age: 79
End: 2024-12-12
Payer: MEDICARE

## 2024-12-12 VITALS
OXYGEN SATURATION: 98 % | DIASTOLIC BLOOD PRESSURE: 89 MMHG | HEART RATE: 78 BPM | BODY MASS INDEX: 18.99 KG/M2 | HEIGHT: 65 IN | SYSTOLIC BLOOD PRESSURE: 176 MMHG | WEIGHT: 114 LBS

## 2024-12-12 DIAGNOSIS — Z47.1 AFTERCARE FOLLOWING RIGHT HIP JOINT REPLACEMENT SURGERY: ICD-10-CM

## 2024-12-12 DIAGNOSIS — M25.551 RIGHT HIP PAIN: Primary | ICD-10-CM

## 2024-12-12 DIAGNOSIS — Z96.641 AFTERCARE FOLLOWING RIGHT HIP JOINT REPLACEMENT SURGERY: ICD-10-CM

## 2024-12-12 PROCEDURE — 99024 POSTOP FOLLOW-UP VISIT: CPT | Performed by: ORTHOPAEDIC SURGERY

## 2024-12-12 NOTE — PROGRESS NOTES
"Chief Complaint  Pain and Follow-up of the Right Hip       Subjective      Mi Keane presents to [facility] for a follow up for her right hip. She underwent a right hip arthroplasty performed on 09/04/24. She is overall doing well and is happy with her hip. She has been doing home exercises and denies any new injury or falls.     No Known Allergies     Social History     Socioeconomic History    Marital status:    Tobacco Use    Smoking status: Never    Smokeless tobacco: Never   Vaping Use    Vaping status: Never Used   Substance and Sexual Activity    Alcohol use: Never    Drug use: Never    Sexual activity: Defer        I reviewed the patient's chief complaint, history of present illness, review of systems, past medical history, surgical history, family history, social history, medications, and allergy list.     Review of Systems     Constitutional: Denies fevers, chills, weight loss  Cardiovascular: Denies chest pain, shortness of breath  Skin: Denies rashes, acute skin changes  Neurologic: Denies headache, loss of consciousness  MSK: Right hip pain       Vital Signs:   /89   Pulse 78   Ht 165.1 cm (65\")   Wt 51.7 kg (114 lb)   SpO2 98%   BMI 18.97 kg/m²            Ortho Exam    Physical Exam  General:Alert. No acute distress     Right lower extremity: well healed surgical incision, flexion to 90 degrees, external rotation to 25 degrees, internal rotation to 10 degrees, negative straight leg raise, no swelling, no redness, neurovascularly intact, calf soft, positive EHL, FHL, GS, and TA. Sensation intact to all 5 nerves of the foot.     Procedures    X-Ray Report:  Right hip X-Ray  Indication: Evaluation of right hip pain   AP/Lateral view(s)  Findings: intact right hip replacement. no signs of loosening, subsidence or periprosthetic fracture  Prior studies available for comparison: yes       Imaging Results (Most Recent)       Procedure Component Value Units " Date/Time    XR Hip With or Without Pelvis 2 - 3 View Right [[id number]] Resulted: 12/12/24 1046     Updated: 12/12/24 1051             Result Review :       No results found.           Assessment and Plan     Diagnoses and all orders for this visit:    1. Right hip pain (Primary)  -     XR Hip With or Without Pelvis 2 - 3 View Right    2. Aftercare following right hip joint replacement surgery        The patient presents here today for a follow up for her right hip. X-rays were obtained in the office today and these were reviewed today.     She is overall doing well and will continue current medications for pain control and activities as tolerated.     Call or return if worsening symptoms.    Follow Up     PRN     Will obtain X-Rays of right hip at next visit.       Patient was given instructions and counseling regarding her condition or for health maintenance advice. Please see specific information pulled into the AVS if appropriate.     Scribed for Talat Noguera MD by Nemo Martinez.  12/12/24   10:43 EST    I have personally performed the services described in this document as scribed by the above individual and it is both accurate and complete. Talat Noguera MD 12/14/24

## 2025-05-12 ENCOUNTER — TRANSCRIBE ORDERS (OUTPATIENT)
Dept: ADMINISTRATIVE | Facility: HOSPITAL | Age: 80
End: 2025-05-12
Payer: MEDICARE

## 2025-05-12 ENCOUNTER — LAB (OUTPATIENT)
Dept: LAB | Facility: HOSPITAL | Age: 80
End: 2025-05-12
Payer: MEDICARE

## 2025-05-12 ENCOUNTER — HOSPITAL ENCOUNTER (OUTPATIENT)
Dept: CARDIOLOGY | Facility: HOSPITAL | Age: 80
Discharge: HOME | End: 2025-05-12
Payer: MEDICARE

## 2025-05-12 DIAGNOSIS — N81.9 FEMALE GENITAL PROLAPSE, UNSPECIFIED TYPE: ICD-10-CM

## 2025-05-12 DIAGNOSIS — N81.9 FEMALE GENITAL PROLAPSE, UNSPECIFIED TYPE: Primary | ICD-10-CM

## 2025-05-12 LAB
ABO GROUP BLD: NORMAL
ANION GAP SERPL CALCULATED.3IONS-SCNC: 9.1 MMOL/L (ref 5–15)
BASOPHILS # BLD AUTO: 0.07 10*3/MM3 (ref 0–0.2)
BASOPHILS NFR BLD AUTO: 0.9 % (ref 0–1.5)
BLD GP AB SCN SERPL QL: NEGATIVE
BUN SERPL-MCNC: 13 MG/DL (ref 8–23)
BUN/CREAT SERPL: 14.9 (ref 7–25)
CALCIUM SPEC-SCNC: 11.2 MG/DL (ref 8.6–10.5)
CHLORIDE SERPL-SCNC: 100 MMOL/L (ref 98–107)
CO2 SERPL-SCNC: 26.9 MMOL/L (ref 22–29)
CREAT SERPL-MCNC: 0.87 MG/DL (ref 0.57–1)
DEPRECATED RDW RBC AUTO: 43.4 FL (ref 37–54)
EGFRCR SERPLBLD CKD-EPI 2021: 68.7 ML/MIN/1.73
EOSINOPHIL # BLD AUTO: 0.11 10*3/MM3 (ref 0–0.4)
EOSINOPHIL NFR BLD AUTO: 1.5 % (ref 0.3–6.2)
ERYTHROCYTE [DISTWIDTH] IN BLOOD BY AUTOMATED COUNT: 12.9 % (ref 12.3–15.4)
GLUCOSE SERPL-MCNC: 106 MG/DL (ref 65–99)
HCT VFR BLD AUTO: 42.4 % (ref 34–46.6)
HGB BLD-MCNC: 13.5 G/DL (ref 12–15.9)
IMM GRANULOCYTES # BLD AUTO: 0.02 10*3/MM3 (ref 0–0.05)
IMM GRANULOCYTES NFR BLD AUTO: 0.3 % (ref 0–0.5)
LYMPHOCYTES # BLD AUTO: 1.02 10*3/MM3 (ref 0.7–3.1)
LYMPHOCYTES NFR BLD AUTO: 13.5 % (ref 19.6–45.3)
MCH RBC QN AUTO: 29.1 PG (ref 26.6–33)
MCHC RBC AUTO-ENTMCNC: 31.8 G/DL (ref 31.5–35.7)
MCV RBC AUTO: 91.4 FL (ref 79–97)
MONOCYTES # BLD AUTO: 0.58 10*3/MM3 (ref 0.1–0.9)
MONOCYTES NFR BLD AUTO: 7.7 % (ref 5–12)
NEUTROPHILS NFR BLD AUTO: 5.77 10*3/MM3 (ref 1.7–7)
NEUTROPHILS NFR BLD AUTO: 76.1 % (ref 42.7–76)
NRBC BLD AUTO-RTO: 0 /100 WBC (ref 0–0.2)
PLATELET # BLD AUTO: 363 10*3/MM3 (ref 140–450)
PMV BLD AUTO: 10.1 FL (ref 6–12)
POTASSIUM SERPL-SCNC: 4.6 MMOL/L (ref 3.5–5.2)
RBC # BLD AUTO: 4.64 10*6/MM3 (ref 3.77–5.28)
RH BLD: NEGATIVE
SODIUM SERPL-SCNC: 136 MMOL/L (ref 136–145)
T&S EXPIRATION DATE: NORMAL
WBC NRBC COR # BLD AUTO: 7.57 10*3/MM3 (ref 3.4–10.8)

## 2025-05-12 PROCEDURE — 86901 BLOOD TYPING SEROLOGIC RH(D): CPT

## 2025-05-12 PROCEDURE — 86900 BLOOD TYPING SEROLOGIC ABO: CPT

## 2025-05-12 PROCEDURE — 80048 BASIC METABOLIC PNL TOTAL CA: CPT

## 2025-05-12 PROCEDURE — 93005 ELECTROCARDIOGRAM TRACING: CPT | Performed by: UROLOGY

## 2025-05-12 PROCEDURE — 86850 RBC ANTIBODY SCREEN: CPT

## 2025-05-12 PROCEDURE — 85025 COMPLETE CBC W/AUTO DIFF WBC: CPT

## 2025-05-12 PROCEDURE — 36415 COLL VENOUS BLD VENIPUNCTURE: CPT

## 2025-05-13 LAB
QT INTERVAL: 364 MS
QTC INTERVAL: 410 MS

## (undated) DEVICE — ANTIBACTERIAL VIOLET BRAIDED (POLYGLACTIN 910), SYNTHETIC ABSORBABLE SURGICAL SUTURE: Brand: COATED VICRYL

## (undated) DEVICE — SOL IRR NACL 0.9PCT BT 1000ML

## (undated) DEVICE — STERILE POLYISOPRENE POWDER-FREE SURGICAL GLOVES WITH EMOLLIENT COATING: Brand: PROTEXIS

## (undated) DEVICE — STERILE POLYISOPRENE POWDER-FREE SURGICAL GLOVES: Brand: PROTEXIS

## (undated) DEVICE — DRP SURG U/DRP INVISISHIELD PA 48X52IN

## (undated) DEVICE — Device

## (undated) DEVICE — CVR LEG BOOTLEG F/R NOSKID 33IN

## (undated) DEVICE — TOWEL,OR,DSP,ST,BLUE,STD,4/PK,20PK/CS: Brand: MEDLINE

## (undated) DEVICE — TOTAL ANTERIOR HIP-LF: Brand: MEDLINE INDUSTRIES, INC.

## (undated) DEVICE — STRYKER PERFORMANCE SERIES SAGITTAL BLADE: Brand: STRYKER PERFORMANCE SERIES

## (undated) DEVICE — PENCL ES MEGADINE EZ/CLEAN BUTN W/HOLSTR 10FT

## (undated) DEVICE — PULLOVER TOGA, 2X LARGE: Brand: FLYTE, SURGICOOL

## (undated) DEVICE — SOL IRR NACL 0.9PCT 3000ML

## (undated) DEVICE — GLV SURG SENSICARE PI ORTHO SZ8 LF STRL

## (undated) DEVICE — GAUZE,SPONGE,4"X4",16PLY,STRL,LF,10/TRAY: Brand: MEDLINE

## (undated) DEVICE — SUT VIC 2/0 CT1 36IN

## (undated) DEVICE — KT PT POSITION SUPINE HANA/PROFX TABL

## (undated) DEVICE — MAT FLR ABS W/BLU/LINER 56X72IN WHT

## (undated) DEVICE — APPL CHLORAPREP HI/LITE 26ML ORNG

## (undated) DEVICE — ELECTRD BLD EZ CLN STD 6.5IN

## (undated) DEVICE — SLV SCD KN/LEN ADJ EXPRSS BLENDED MD 1P/U

## (undated) DEVICE — HANDPIECE SET WITH HIGH FLOW TIP AND SUCTION TUBE: Brand: INTERPULSE

## (undated) DEVICE — BIPOLAR SEALER 23-112-1 AQM 6.0: Brand: AQUAMANTYS™